# Patient Record
Sex: FEMALE | Race: WHITE | NOT HISPANIC OR LATINO | Employment: OTHER | URBAN - METROPOLITAN AREA
[De-identification: names, ages, dates, MRNs, and addresses within clinical notes are randomized per-mention and may not be internally consistent; named-entity substitution may affect disease eponyms.]

---

## 2017-01-20 ENCOUNTER — HOSPITAL ENCOUNTER (OUTPATIENT)
Dept: CARDIOLOGY | Facility: HOSPITAL | Age: 82
Discharge: HOME | End: 2017-01-20
Attending: INTERNAL MEDICINE | Admitting: INTERNAL MEDICINE

## 2019-02-04 ENCOUNTER — HOSPITAL ENCOUNTER (OUTPATIENT)
Dept: CARDIOLOGY | Facility: HOSPITAL | Age: 84
Discharge: HOME | End: 2019-02-04
Attending: INTERNAL MEDICINE | Admitting: INTERNAL MEDICINE

## 2019-07-08 ENCOUNTER — ON CAMPUS - OUTPATIENT (OUTPATIENT)
Dept: URBAN - METROPOLITAN AREA HOSPITAL 77 | Facility: HOSPITAL | Age: 84
End: 2019-07-08
Payer: COMMERCIAL

## 2019-07-08 DIAGNOSIS — K21.9 GASTRO-ESOPHAGEAL REFLUX DISEASE WITHOUT ESOPHAGITIS: ICD-10-CM

## 2019-07-08 DIAGNOSIS — R10.13 EPIGASTRIC PAIN: ICD-10-CM

## 2019-07-08 DIAGNOSIS — K29.70 GASTRITIS, UNSPECIFIED, WITHOUT BLEEDING: ICD-10-CM

## 2019-07-08 DIAGNOSIS — K44.9 DIAPHRAGMATIC HERNIA WITHOUT OBSTRUCTION OR GANGRENE: ICD-10-CM

## 2019-07-08 PROCEDURE — 43239 EGD BIOPSY SINGLE/MULTIPLE: CPT | Performed by: INTERNAL MEDICINE

## 2019-08-30 ENCOUNTER — OFFICE VISIT (OUTPATIENT)
Dept: CARDIOLOGY | Facility: CLINIC | Age: 84
End: 2019-08-30

## 2019-08-30 VITALS
HEIGHT: 55 IN | OXYGEN SATURATION: 94 % | DIASTOLIC BLOOD PRESSURE: 74 MMHG | SYSTOLIC BLOOD PRESSURE: 130 MMHG | BODY MASS INDEX: 30.18 KG/M2 | HEART RATE: 76 BPM | WEIGHT: 130.4 LBS

## 2019-08-30 DIAGNOSIS — I34.0 NON-RHEUMATIC MITRAL REGURGITATION: ICD-10-CM

## 2019-08-30 DIAGNOSIS — I25.10 CORONARY ARTERY DISEASE INVOLVING NATIVE CORONARY ARTERY OF NATIVE HEART WITHOUT ANGINA PECTORIS: Primary | ICD-10-CM

## 2019-08-30 DIAGNOSIS — Z98.61 POST PTCA: ICD-10-CM

## 2019-08-30 DIAGNOSIS — I10 ESSENTIAL HYPERTENSION: ICD-10-CM

## 2019-08-30 DIAGNOSIS — E78.5 HYPERLIPIDEMIA, UNSPECIFIED HYPERLIPIDEMIA TYPE: ICD-10-CM

## 2019-08-30 PROCEDURE — 93000 ELECTROCARDIOGRAM COMPLETE: CPT | Performed by: INTERNAL MEDICINE

## 2019-08-30 PROCEDURE — 99213 OFFICE O/P EST LOW 20 MIN: CPT | Performed by: INTERNAL MEDICINE

## 2019-08-30 RX ORDER — ACETAMINOPHEN 160 MG
TABLET,DISINTEGRATING ORAL
COMMUNITY
Start: 2019-02-13

## 2019-08-30 RX ORDER — ZOLEDRONIC ACID 5 MG/100ML
INJECTION, SOLUTION INTRAVENOUS
COMMUNITY
Start: 2018-08-08

## 2019-08-30 RX ORDER — DILTIAZEM HYDROCHLORIDE 300 MG/1
CAPSULE, COATED, EXTENDED RELEASE ORAL DAILY
COMMUNITY
Start: 2019-08-28 | End: 2020-02-25 | Stop reason: SDUPTHER

## 2019-08-30 RX ORDER — ATORVASTATIN CALCIUM 20 MG/1
20 TABLET, FILM COATED ORAL DAILY
Refills: 7 | COMMUNITY
Start: 2019-07-10 | End: 2019-10-05 | Stop reason: SDUPTHER

## 2019-08-30 RX ORDER — NITROGLYCERIN 0.4 MG/1
TABLET SUBLINGUAL
COMMUNITY
Start: 2013-05-30 | End: 2020-09-08

## 2019-08-30 RX ORDER — ASPIRIN 81 MG/1
TABLET ORAL
COMMUNITY

## 2019-08-30 RX ORDER — ISOSORBIDE MONONITRATE 60 MG/1
60 TABLET, EXTENDED RELEASE ORAL DAILY
Refills: 2 | COMMUNITY
Start: 2019-05-28 | End: 2020-02-28 | Stop reason: SDUPTHER

## 2019-08-30 RX ORDER — ESOMEPRAZOLE MAGNESIUM 40 MG/1
CAPSULE, DELAYED RELEASE ORAL
COMMUNITY
End: 2020-03-03

## 2019-08-30 RX ORDER — VENLAFAXINE HYDROCHLORIDE 75 MG/1
75 CAPSULE, EXTENDED RELEASE ORAL DAILY
Refills: 4 | COMMUNITY
Start: 2019-07-10

## 2019-08-30 NOTE — PROGRESS NOTES
Visit Type:  Follow-up Visit- 6 month   Primary Care Provider:  Dr. Alirio Bryatn     Chief Complaint:  echo results: .     History of Present Illness:        Dear Alirio     CC: Follow-up for coronary artery disease, hypertension, dyslipidemia, .Mitral regurgitation     Mrs. Renuka Rodrigez is a delightful 83 years old lady with history of coronary artery disease,  previous PCI stenting of LAD several years ago, generalized arthritis, dyslipidemia.      Her Christa scan Cardiolite study in June of 2016 was normal with no evidence of inducible myocardial ischemia.  Her LV ejection fraction was excellent.    She has had only one episode of left shoulder discomfort with some numbness in the left arm lasting for several minutes that eventually resolved.  There has been no further episodes.  Blood pressure today was 130/74.     Her last lipid profile showed total cholesterol  of 158 HDL 71 LDL 69 triglycerides 98 on 10/10/2017.      Her EKG today  showed normal sinus rhythm with a rate of 76 bpm.  WY interval was 173 ms QRS duration 90 ms  ms and QRS axis was 80.  As compared to previous EKG of 2/13/2019, there has been no significant change.    I will see her  in the office in 6 months for follow-up. her echocardiogram showed LV ejection fraction of 60-65%, mild-to-moderate mitral regurgitation slightly worse as compared to previous echocardiogram, mild aortic regurgitation and mild tricuspid   regurgitation.       A/P     1-CAD -   Status post PCI stenting 9 years ago.  Stable with no recurrence of angina.  She wishes to come off of Brilinta and I think that is quite reasonable.     2-Hypertension under good control with diltiazem long-acting     3-Dyslipidemia under good control   She was switched from Crestor to atorvastatin recently  4-   Valvular heart disease as noted in HPI     Thanks very much for allowing us to participate in the care of your patients                   Problems: Active problems were  reviewed with the patient during this visit.  Medications: Medications were reviewed with the patient during this visit.  Allergies: Allergies were reviewed with the patient during this visit.                   Past Medical History:     Reviewed history from 07/08/2016 and no changes required:        Coronary Artery Disease: S/P PCI         Hypertension        Anxiety Disorder        Arthritis        Hyperlipidemia        Osteoporosis        Bulging disc in spine         Factor 5         Valvular Heart Disease     Past Surgical History:     Reviewed history from 12/11/2015 and no changes required:        Heart Catherization Status Post PCI to LAD 2008        Bladder Repair         Stomach Surgery         Cholecystectomy        Right breast biopsy : Dec. 2014        Partial Hysterectomy         Cataract Extraction        Total Knee Arthroplasty: Left - Aug. 27, 2015     Active Medications (reviewed today):  ATORVASTATIN CALCIUM 20 MG ORAL TABLET (ATORVASTATIN CALCIUM) Take 1 tablet by mouth daily  RECLAST 5 MG/100ML INTRAVENOUS SOLUTION (ZOLEDRONIC ACID) Yearly  HYDROCHLOROTHIAZIDE 25 MG ORAL TABLET (HYDROCHLOROTHIAZIDE) Take 1 tablet by mouth daily  DILTIAZEM 24HR  MG CAP (DILTIAZEM HCL COATED BEADS) TAKE 1 CAPSULE BY MOUTH EVERY DAY  VITAMIN D3 2000 UNIT ORAL CAPSULE (CHOLECALCIFEROL) Take one by mouth daily  ASPIR-LOW 81 MG ORAL TABLET DELAYED RELEASE (ASPIRIN) Take 1 tablet by mouth daily  CELEBREX 200 MG ORAL CAPSULE (CELECOXIB) Take 1 tablet by mouth daily  EFFEXOR XR 75 MG ORAL CAPSULE EXTENDED RELEASE 24 HOUR (VENLAFAXINE HCL) Take 1 tablet by mouth daily  NEXIUM 40 MG ORAL CAPSULE DELAYED RELEASE (ESOMEPRAZOLE MAGNESIUM) Take 1 tablet by mouth daily  BRILINTA 90 MG TABLET (TICAGRELOR) TAKE 1 TABLET BY MOUTH TWICE A DAY  ISOSORBIDE MONONIT ER 60 MG TB (ISOSORBIDE MONONITRATE) TAKE 1 TABLET BY MOUTH EVERY DAY  CITRACAL/VITAMIN D 250-200 MG-UNIT ORAL TABLET (CALCIUM CITRATE-VITAMIN D) 1 tablet  daily  NITROSTAT 0.4 MG SUBLINGUAL TABLET SUBLINGUAL (NITROGLYCERIN) Sublingul Prn     Current Allergies (reviewed today):  ERYTHROMYCIN (Critical)     Family History Summary:      Reviewed history Last on 08/08/2018 and no changes required:02/13/2019        General Comments - FH:  FH Diabetes - Paternal / Sibling   Family History of Arthritis  FH Stroke Sibling   FH Heart Disease: daughter age 60 passed away May 2014 of massive MI , another daughter age 57 June 2014 had PCI / 2 stents placed.         Social History:     Reviewed history from 05/30/2013 and no changes required:        Marital Status:          Children: 3 - Special needs Child / Living at Home        Occupation: retired            Risk Factors:      Smoked Tobacco Use:  Never smoker  Smokeless Tobacco Use:  Never  Passive smoke exposure:  no  Drug use:  no  HIV high-risk behavior:  no  Caffeine use:  1 drinks per day  Alcohol use:  yes     Type:  Glass of wine- rarely      Has patient --        Felt need to cut down:  no        Been annoyed by complaints:  no        Felt guilty about drinking:  no        Needed eye opener in the morning:  no     Counseled to quit/cut down alcohol use:  no  Exercise:  no  Seatbelt use:  100 %  Sun Exposure:  occasionally     Family History Risk Factors:     Family History of MI in females < 65 years old:  no     Family History of MI in males < 55 years old:  no           Review of Systems   General: denies fevers, chills, sweats, anorexia, fatigue, malaise, weight loss  Eyes: denies blurring, diplopia, irritation, discharge, vision loss, eye pain, photophobia  Ear/Nose/Throat: denies ear pain or discharge, tinnitus, decreased hearing, nasal obstruction or discharge, nosebleeds, sore throat, hoarseness, dysphagia  Cardiovascular: coronary artery disease, status post PCI stenting of LAD 2008. Hypertension. Dyslipidemia.  negative myocardial perfusion imaging study on 06/24/2016.  valvular heart  disease  Respiratory: Denies cough, dyspnea, excessive sputum, hemoptysis, wheezing  Gastrointestinal: history of hiatal hernia  Musculoskeletal: denies back pain, joint pain, joint swelling, muscle cramps, muscle weakness, stiffness, arthritis  Skin: denies rash, itching, dryness, suspicious lesions  Neurologic: denies transient paralysis, weakness, paresthesias, seizures, syncope, tremors, vertigo  Psychiatric: denies depression, anxiety, memory loss, mental disturbance, suicidal ideation, hallucinations, paranoia  Endocrine: denies cold intolerance, heat intolerance, polydipsia, polyphagia, polyuria, weight change        Physical Exam     General:      well developed, well nourished, in no acute distress.    Neck:      no masses, thyromegaly, or abnormal cervical nodes.   no JVD. No carotid bruits  Lungs:      clear bilaterally to auscultation.    Heart:      non-displaced PMI, chest non-tender; regular rate and rhythm, S1, S2 without murmurs, rubs, or gallops  Pulses:      pulses normal in all 4 extremities.    Extremities:       no edema       New Prescriptions/Refills:

## 2019-10-07 RX ORDER — ATORVASTATIN CALCIUM 20 MG/1
TABLET, FILM COATED ORAL
Qty: 90 TABLET | Refills: 2 | Status: SHIPPED | OUTPATIENT
Start: 2019-10-07 | End: 2020-06-30 | Stop reason: SDUPTHER

## 2019-11-26 ENCOUNTER — OFFICE (OUTPATIENT)
Dept: URBAN - METROPOLITAN AREA CLINIC 64 | Facility: CLINIC | Age: 84
End: 2019-11-26

## 2019-11-26 VITALS
HEIGHT: 56 IN | WEIGHT: 130 LBS | HEART RATE: 74 BPM | SYSTOLIC BLOOD PRESSURE: 144 MMHG | DIASTOLIC BLOOD PRESSURE: 72 MMHG

## 2019-11-26 DIAGNOSIS — K44.9 DIAPHRAGMATIC HERNIA WITHOUT OBSTRUCTION OR GANGRENE: ICD-10-CM

## 2019-11-26 DIAGNOSIS — K21.9 GASTRO-ESOPHAGEAL REFLUX DISEASE WITHOUT ESOPHAGITIS: ICD-10-CM

## 2019-11-26 DIAGNOSIS — Z86.010 PERSONAL HISTORY OF COLONIC POLYPS: ICD-10-CM

## 2019-11-26 DIAGNOSIS — E11.9 TYPE 2 DIABETES MELLITUS WITHOUT COMPLICATIONS: ICD-10-CM

## 2019-11-26 PROCEDURE — 99213 OFFICE O/P EST LOW 20 MIN: CPT | Performed by: INTERNAL MEDICINE

## 2019-11-26 RX ORDER — DEXLANSOPRAZOLE 60 MG/1
60 CAPSULE, DELAYED RELEASE ORAL
Qty: 90 | Refills: 3 | Status: COMPLETED
Start: 2019-11-26 | End: 2019-12-02

## 2020-01-30 ENCOUNTER — OFFICE (OUTPATIENT)
Dept: URBAN - METROPOLITAN AREA CLINIC 64 | Facility: CLINIC | Age: 85
End: 2020-01-30

## 2020-01-30 VITALS — WEIGHT: 128 LBS | HEIGHT: 56 IN

## 2020-01-30 DIAGNOSIS — K44.9 DIAPHRAGMATIC HERNIA WITHOUT OBSTRUCTION OR GANGRENE: ICD-10-CM

## 2020-01-30 DIAGNOSIS — K21.9 GASTRO-ESOPHAGEAL REFLUX DISEASE WITHOUT ESOPHAGITIS: ICD-10-CM

## 2020-01-30 PROCEDURE — 99213 OFFICE O/P EST LOW 20 MIN: CPT | Performed by: NURSE PRACTITIONER

## 2020-01-30 RX ORDER — DEXLANSOPRAZOLE 60 MG/1
60 CAPSULE, DELAYED RELEASE ORAL
Qty: 90 | Refills: 3 | Status: COMPLETED
Start: 2020-01-30 | End: 2022-09-16

## 2020-02-25 RX ORDER — DILTIAZEM HYDROCHLORIDE 300 MG/1
300 CAPSULE, COATED, EXTENDED RELEASE ORAL DAILY
Qty: 60 CAPSULE | Refills: 1 | Status: SHIPPED | OUTPATIENT
Start: 2020-02-25 | End: 2020-02-26 | Stop reason: SDUPTHER

## 2020-02-26 RX ORDER — DILTIAZEM HYDROCHLORIDE 300 MG/1
300 CAPSULE, COATED, EXTENDED RELEASE ORAL DAILY
Qty: 60 CAPSULE | Refills: 1 | Status: SHIPPED | OUTPATIENT
Start: 2020-02-26 | End: 2020-05-26

## 2020-02-28 RX ORDER — ISOSORBIDE MONONITRATE 60 MG/1
60 TABLET, EXTENDED RELEASE ORAL DAILY
Qty: 90 TABLET | Refills: 0 | Status: SHIPPED | OUTPATIENT
Start: 2020-02-28 | End: 2020-03-03 | Stop reason: SDUPTHER

## 2020-03-03 ENCOUNTER — OFFICE VISIT (OUTPATIENT)
Dept: CARDIOLOGY | Facility: CLINIC | Age: 85
End: 2020-03-03

## 2020-03-03 VITALS
HEIGHT: 55 IN | BODY MASS INDEX: 29.62 KG/M2 | DIASTOLIC BLOOD PRESSURE: 74 MMHG | WEIGHT: 128 LBS | HEART RATE: 72 BPM | SYSTOLIC BLOOD PRESSURE: 125 MMHG | OXYGEN SATURATION: 96 %

## 2020-03-03 DIAGNOSIS — R01.1 HEART MURMUR: ICD-10-CM

## 2020-03-03 DIAGNOSIS — I10 ESSENTIAL HYPERTENSION: ICD-10-CM

## 2020-03-03 DIAGNOSIS — I82.811 LEG VEIN THROMBOEMBOLISM, SUPERFICIAL, RIGHT: ICD-10-CM

## 2020-03-03 DIAGNOSIS — Z79.02 LONG TERM (CURRENT) USE OF ANTITHROMBOTICS/ANTIPLATELETS: ICD-10-CM

## 2020-03-03 DIAGNOSIS — D68.51 HETEROZYGOUS FACTOR V LEIDEN MUTATION (HCC): ICD-10-CM

## 2020-03-03 DIAGNOSIS — E78.2 MIXED HYPERLIPIDEMIA: ICD-10-CM

## 2020-03-03 DIAGNOSIS — I25.10 CORONARY ARTERY DISEASE INVOLVING NATIVE CORONARY ARTERY OF NATIVE HEART WITHOUT ANGINA PECTORIS: Primary | ICD-10-CM

## 2020-03-03 PROCEDURE — 93000 ELECTROCARDIOGRAM COMPLETE: CPT | Performed by: INTERNAL MEDICINE

## 2020-03-03 PROCEDURE — 99214 OFFICE O/P EST MOD 30 MIN: CPT | Performed by: INTERNAL MEDICINE

## 2020-03-03 RX ORDER — DEXLANSOPRAZOLE 60 MG/1
CAPSULE, DELAYED RELEASE ORAL DAILY
COMMUNITY
Start: 2020-01-31

## 2020-03-03 RX ORDER — RIVAROXABAN 20 MG/1
20 TABLET, FILM COATED ORAL DAILY
COMMUNITY
Start: 2020-02-07 | End: 2020-09-03

## 2020-03-03 RX ORDER — ISOSORBIDE MONONITRATE 60 MG/1
60 TABLET, EXTENDED RELEASE ORAL DAILY
Qty: 90 TABLET | Refills: 0 | Status: SHIPPED | OUTPATIENT
Start: 2020-03-03 | End: 2020-03-09

## 2020-03-03 NOTE — PATIENT INSTRUCTIONS
Get labs from Dr ogden office     BMP, Lipids, LFT A1C  Get note from Dr Spencer office  F/U 6 months

## 2020-03-03 NOTE — PROGRESS NOTES
Cardiology Office Visit      Encounter Date:  03/03/2020    Patient ID:   Renuka Rodrigez is a 84 y.o. female.    Reason For Followup:  Coronary artery disease  Hypertension  Hyperlipidemia  Valvular heart disease    Brief Clinical History:  Dear Dr. Bryant, Yosef Madera MD    I had the pleasure of seeing Renuka Rodrigez today. As you are well aware, this is a 84 y.o. female with an established history of ischemic heart disease.  She underwent PCI of her left anterior descending artery in 2008.  She has additional history that includes hypertension, hypertensive cardiovascular disease, hyperlipidemia, antiplatelet therapy, and venous thrombosis.  She presents today for follow-up on the above conditions.    Interval History:  She denies any chest pain pressure heaviness or tightness.  She denies any shortness of breath out of character.  She denies any PND orthopnea.  She denies any syncope or near syncope.  She reports feeling well from a cardiac perspective.    She was diagnosed with a superficial thrombosis several months ago in her lower extremity with some questionable involvement in her deep veins.  She was started on Xarelto in an effort to resolve the clot.  She was taken off of this 3 weeks ago by her hematologist Dr Spencer.  She has a follow-up appointment with him in the next week or 2.  She has a questionable history of factor V Leiden deficiency.  We will obtain records from his office to confirm the status of this.    Assessment & Plan    Impressions:  Coronary artery disease status post PCI LAD 2008  Hypertension  Hypertensive cardiovascular disease  Hyperlipidemia  Antiplatelet therapy  Venous thrombosis  Questionable history of factor V Leiden deficiency  Valvular heart disease with mild to moderate mitral insufficiency, mild aortic insufficiency, mild tricuspid and pulmonic insufficiencies.    Recommendations:  Continuation of her current cardiovascular regimen at the present time.  Obtain  "labs from your office  Get notes from hematology office  Periodic echocardiogram  Follow-up in 6 months time sooner should there be difficulties.      Was on Xarelto for superficual thrombosis several months and was then took off about 3 weeks ago and will have another u/s and will be seen on 24th for f/u US    Has factor 5 leiden on one side of family  Has had a couple of chest pains, cares for55 year old handicapped daughter and has 24 hours care shock at bedtime and lasts a few moments and goes away    Coup;e times in the past few months  Will need repeat echpo in about 6-12 months  Has AS murmur        Objective:    Vitals:  Vitals:    03/03/20 1411   BP: 125/74   Pulse: 72   SpO2: 96%   Weight: 58.1 kg (128 lb)   Height: 137.2 cm (54\")       Physical Exam:    General: Alert, cooperative, no distress, appears stated age  Head:  Normocephalic, atraumatic, mucous membranes moist  Eyes:  Conjunctiva/corneas clear, EOM's intact     Neck:  Supple,  no bruit     Lungs: Clear to auscultation bilaterally, no wheezes rhonchi rales are noted  Chest wall: No tenderness  Heart::  Regular rate and rhythm, S1 and S2 normal, 2/6 systolic ejection murmur rub or gallop  Abdomen: Soft, non-tender, nondistended bowel sounds active  Extremities: No cyanosis, clubbing, or edema  Pulses: 2+ and symmetric all extremities  Skin:  No rashes or lesions  Neuro/psych: A&O x3. CN II through XII are grossly intact with appropriate affect      Allergies:  Allergies   Allergen Reactions   • Erythromycin GI Intolerance       Medication Review:     Current Outpatient Medications:   •  aspirin (ASPIR-LOW) 81 MG EC tablet, ASPIR-LOW 81 MG TBEC, Disp: , Rfl:   •  atorvastatin (LIPITOR) 20 MG tablet, TAKE 1 TABLET BY MOUTH EVERY DAY, Disp: 90 tablet, Rfl: 2  •  Calcium Citrate-Vitamin D 1000-400 liquid, CITRACAL/VITAMIN D 250-200 MG-UNIT ORAL TABLET, Disp: , Rfl:   •  dexlansoprazole (DEXILANT) 60 MG capsule, Daily., Disp: , Rfl:   •  dilTIAZem CD " (CARDIZEM CD) 300 MG 24 hr capsule, Take 1 capsule by mouth Daily., Disp: 60 capsule, Rfl: 1  •  isosorbide mononitrate (IMDUR) 60 MG 24 hr tablet, Take 1 tablet by mouth Daily., Disp: 90 tablet, Rfl: 0  •  Multiple Vitamins-Minerals (MULTIVITAMIN ADULTS) tablet, Take  by mouth Daily., Disp: , Rfl:   •  nitroglycerin (NITROSTAT) 0.4 MG SL tablet, NITROSTAT 0.4 MG SUBL, Disp: , Rfl:   •  venlafaxine XR (EFFEXOR-XR) 75 MG 24 hr capsule, Take 75 mg by mouth Daily., Disp: , Rfl: 4  •  zoledronic acid (RECLAST) 5 MG/100ML solution infusion, RECLAST 5 MG/100ML SOLN, Disp: , Rfl:   •  Cholecalciferol (VITAMIN D3) 2000 units capsule, VITAMIN D3 2000 UNIT CAPS, Disp: , Rfl:   •  XARELTO 20 MG tablet, Take 20 mg by mouth Daily., Disp: , Rfl:     Family History:  Family History   Problem Relation Age of Onset   • Stroke Mother    • Stroke Sister    • Stroke Sister    • Heart attack Daughter    • Heart disease Daughter        Past Medical History:  Past Medical History:   Diagnosis Date   • Anxiety disorder    • Arthritis    • Coronary artery disease    • Diabetes mellitus (CMS/HCC)    • Disc disorder    • Factor 5 Leiden mutation, heterozygous (CMS/HCC)    • GERD (gastroesophageal reflux disease)    • Hiatal hernia    • History of blood clots 2020    superficial in right leg    • Hyperlipidemia    • Hypertension    • Neuropathy    • Osteoporosis    • Valvular disease        Past surgical History:  Past Surgical History:   Procedure Laterality Date   • ABDOMINAL SURGERY     • BLADDER REPAIR     • BREAST BIOPSY Right    • CARDIAC CATHETERIZATION     • CATARACT EXTRACTION     • CHOLECYSTECTOMY     • CORONARY ANGIOPLASTY     • HYSTERECTOMY      partial    • TOTAL KNEE ARTHROPLASTY Left        Social History:  Social History     Socioeconomic History   • Marital status:      Spouse name: Not on file   • Number of children: Not on file   • Years of education: Not on file   • Highest education level: Not on file   Tobacco Use    • Smoking status: Never Smoker   • Smokeless tobacco: Never Used   Substance and Sexual Activity   • Alcohol use: Yes     Frequency: Never     Comment: occasional wine    • Drug use: No       Review of Systems:  The following systems were reviewed as they relate to the cardiovascular system: Constitutional, Eyes, ENT, Cardiovascular, Respiratory, Gastrointestinal, Integumentary, Neurological, Psychiatric, Hematologic, Endocrine, Musculoskeletal, and Genitourinary. The pertinent cardiovascular findings are reported above with all other cardiovascular points within those systems being negative.    Diagnostic Study Review:     Current Electrocardiogram:    ECG 12 Lead  Date/Time: 3/3/2020 7:52 AM  Performed by: Felton Patel DO  Authorized by: Felton Patel DO   Comparison: not compared with previous ECG   Previous ECG: no previous ECG available  Comments: Normal sinus rhythm with a ventricular to 72 bpm.  Low voltage in the precordial leads.  Consider LVH with secondary repolarization changes.  Normal QT and QTc intervals.  Normal QRS axis.              NOTE: The following portions of the patient's history were reviewed and updated this visit as appropriate: allergies, current medications, past family history, past medical history, past social history, past surgical history and problem list.

## 2020-03-04 PROBLEM — Z79.02 LONG TERM (CURRENT) USE OF ANTITHROMBOTICS/ANTIPLATELETS: Status: ACTIVE | Noted: 2020-03-04

## 2020-03-04 PROBLEM — E78.5 HYPERLIPIDEMIA: Status: ACTIVE | Noted: 2020-03-04

## 2020-03-04 PROBLEM — I25.10 CORONARY ARTERY DISEASE: Status: ACTIVE | Noted: 2020-03-04

## 2020-03-04 PROBLEM — D68.51 HETEROZYGOUS FACTOR V LEIDEN MUTATION (HCC): Status: ACTIVE | Noted: 2020-01-22

## 2020-03-04 PROBLEM — I10 HYPERTENSION: Status: ACTIVE | Noted: 2020-03-04

## 2020-03-04 PROBLEM — I82.811: Status: ACTIVE | Noted: 2019-12-11

## 2020-03-04 PROBLEM — R01.1 HEART MURMUR: Status: ACTIVE | Noted: 2017-01-13

## 2020-03-09 RX ORDER — ISOSORBIDE MONONITRATE 60 MG/1
TABLET, EXTENDED RELEASE ORAL
Qty: 90 TABLET | Refills: 2 | Status: SHIPPED | OUTPATIENT
Start: 2020-03-09 | End: 2021-01-14

## 2020-05-26 RX ORDER — DILTIAZEM HYDROCHLORIDE 300 MG/1
CAPSULE, COATED, EXTENDED RELEASE ORAL
Qty: 90 CAPSULE | Refills: 1 | Status: SHIPPED | OUTPATIENT
Start: 2020-05-26 | End: 2021-02-25

## 2020-06-30 RX ORDER — ATORVASTATIN CALCIUM 20 MG/1
20 TABLET, FILM COATED ORAL DAILY
Qty: 90 TABLET | Refills: 2 | Status: SHIPPED | OUTPATIENT
Start: 2020-06-30 | End: 2020-09-03

## 2020-08-27 PROBLEM — R60.9 EDEMA: Status: ACTIVE | Noted: 2017-01-13

## 2020-08-27 PROBLEM — M19.90 ARTHRITIS: Status: ACTIVE | Noted: 2020-08-27

## 2020-08-27 PROBLEM — F41.9 ANXIETY DISORDER: Status: ACTIVE | Noted: 2020-08-27

## 2020-09-03 ENCOUNTER — OFFICE VISIT (OUTPATIENT)
Dept: CARDIOLOGY | Facility: CLINIC | Age: 85
End: 2020-09-03

## 2020-09-03 VITALS
BODY MASS INDEX: 30.32 KG/M2 | RESPIRATION RATE: 18 BRPM | DIASTOLIC BLOOD PRESSURE: 79 MMHG | OXYGEN SATURATION: 97 % | WEIGHT: 131 LBS | SYSTOLIC BLOOD PRESSURE: 146 MMHG | HEIGHT: 55 IN | HEART RATE: 82 BPM

## 2020-09-03 DIAGNOSIS — Z79.02 LONG TERM (CURRENT) USE OF ANTITHROMBOTICS/ANTIPLATELETS: ICD-10-CM

## 2020-09-03 DIAGNOSIS — I38 VALVULAR HEART DISEASE: Primary | ICD-10-CM

## 2020-09-03 DIAGNOSIS — R07.89 CHEST DISCOMFORT: ICD-10-CM

## 2020-09-03 DIAGNOSIS — I82.811 LEG VEIN THROMBOEMBOLISM, SUPERFICIAL, RIGHT: ICD-10-CM

## 2020-09-03 DIAGNOSIS — I25.10 CORONARY ARTERY DISEASE INVOLVING NATIVE CORONARY ARTERY OF NATIVE HEART WITHOUT ANGINA PECTORIS: ICD-10-CM

## 2020-09-03 DIAGNOSIS — I10 ESSENTIAL HYPERTENSION: ICD-10-CM

## 2020-09-03 DIAGNOSIS — E78.2 MIXED HYPERLIPIDEMIA: ICD-10-CM

## 2020-09-03 PROCEDURE — 99214 OFFICE O/P EST MOD 30 MIN: CPT | Performed by: INTERNAL MEDICINE

## 2020-09-03 PROCEDURE — 93000 ELECTROCARDIOGRAM COMPLETE: CPT | Performed by: INTERNAL MEDICINE

## 2020-09-03 RX ORDER — ROSUVASTATIN CALCIUM 10 MG/1
10 TABLET, COATED ORAL DAILY
COMMUNITY
Start: 2020-08-24

## 2020-09-03 RX ORDER — METHOCARBAMOL 750 MG/1
TABLET, FILM COATED ORAL
COMMUNITY
Start: 2020-06-03 | End: 2021-11-11

## 2020-09-03 NOTE — PROGRESS NOTES
Cardiology Office Visit      Encounter Date:  09/03/2020    Patient ID:   Renuka Rodrigez is a 85 y.o. female.    Reason For Followup:  Coronary artery disease  Hypertension  Hyperlipidemia  Valvular heart disease    Brief Clinical History:  Dear Dr. Bryant, Yosef Madera MD    I had the pleasure of seeing Renuka Rodrigez today. As you are well aware, this is a 85 y.o. female with an established history of ischemic heart disease.  She underwent PCI of her left anterior descending artery in 2008.  She has additional history that includes hypertension, hypertensive cardiovascular disease, hyperlipidemia, antiplatelet therapy, and venous thrombosis.  She presents today for follow-up on the above conditions.    Interval History:  She denies any chest pain pressure heaviness or tightness.  She denies any shortness of breath out of character.  She denies any PND orthopnea.  She denies any syncope or near syncope.  She reports feeling well from a cardiac perspective.    She has been seeing Dr. Spencer and Dr. Stevens for her superficial thrombosis She may need to have a procedure to help with this.  She is taking aspirin.  She also reports that she has had some nonexertional chest aches that occur at night.  There is some radiation down her left arm.  This again is nonexertional.  She reports that her left arm will go numb from time to time and is independent of the discomfort.    Assessment & Plan    Impressions:  Coronary artery disease status post PCI LAD 2008  Hypertension  Hypertensive cardiovascular disease  Hyperlipidemia  Antiplatelet therapy  Venous thrombosis  Questionable history of factor V Leiden deficiency  Valvular heart disease with mild to moderate mitral insufficiency, mild aortic insufficiency, mild tricuspid and pulmonic insufficiencies.    Recommendations:  Continuation of her current cardiovascular regimen at the present time.  Obtain labs from your office  Periodic echocardiogram to check for  "valvular insufficiency  Follow-up in 6 months time sooner should there be difficulties.    Objective:    Vitals:  Vitals:    09/03/20 1121   BP: 146/79   BP Location: Left arm   Patient Position: Sitting   Cuff Size: Large Adult   Pulse: 82   Resp: 18   SpO2: 97%   Weight: 59.4 kg (131 lb)   Height: 139.7 cm (55\")       Physical Exam:    General: Alert, cooperative, no distress, appears stated age  Head:  Normocephalic, atraumatic, mucous membranes moist  Eyes:  Conjunctiva/corneas clear, EOM's intact     Neck:  Supple,  no bruit  Lungs: Clear to auscultation bilaterally, no wheezes rhonchi rales are noted  Chest wall: No tenderness  Heart::  Regular rate and rhythm, S1 and S2 normal, 1/6 holosystolic murmur.  No rub or gallop  Abdomen: Soft, non-tender, nondistended bowel sounds active  Extremities: No cyanosis, clubbing, or edema  Pulses: 2+ and symmetric all extremities  Skin:  No rashes or lesions  Neuro/psych: A&O x3. CN II through XII are grossly intact with appropriate affect      Allergies:  Allergies   Allergen Reactions   • Erythromycin GI Intolerance       Medication Review:     Current Outpatient Medications:   •  aspirin (ASPIR-LOW) 81 MG EC tablet, ASPIR-LOW 81 MG TBEC, Disp: , Rfl:   •  atorvastatin (LIPITOR) 20 MG tablet, Take 1 tablet by mouth Daily., Disp: 90 tablet, Rfl: 2  •  Calcium Citrate-Vitamin D 1000-400 liquid, CITRACAL/VITAMIN D 250-200 MG-UNIT ORAL TABLET, Disp: , Rfl:   •  Cholecalciferol (VITAMIN D3) 2000 units capsule, VITAMIN D3 2000 UNIT CAPS, Disp: , Rfl:   •  dexlansoprazole (DEXILANT) 60 MG capsule, Daily., Disp: , Rfl:   •  dilTIAZem CD (CARDIZEM CD) 300 MG 24 hr capsule, TAKE 1 CAPSULE BY MOUTH EVERY DAY, Disp: 90 capsule, Rfl: 1  •  isosorbide mononitrate (IMDUR) 60 MG 24 hr tablet, TAKE 1 TABLET BY MOUTH EVERY DAY, Disp: 90 tablet, Rfl: 2  •  methocarbamol (ROBAXIN) 750 MG tablet, TAKE 1 TABLET BY MOUTH 4 TIMES A DAY AS NEEDED, Disp: , Rfl:   •  Multiple Vitamins-Minerals " (MULTIVITAMIN ADULTS) tablet, Take  by mouth Daily., Disp: , Rfl:   •  nitroglycerin (NITROSTAT) 0.4 MG SL tablet, NITROSTAT 0.4 MG SUBL, Disp: , Rfl:   •  rosuvastatin (CRESTOR) 10 MG tablet, Take 10 mg by mouth Daily., Disp: , Rfl:   •  venlafaxine XR (EFFEXOR-XR) 75 MG 24 hr capsule, Take 75 mg by mouth Daily., Disp: , Rfl: 4  •  XARELTO 20 MG tablet, Take 20 mg by mouth Daily., Disp: , Rfl:   •  zoledronic acid (RECLAST) 5 MG/100ML solution infusion, RECLAST 5 MG/100ML SOLN, Disp: , Rfl:     Family History:  Family History   Problem Relation Age of Onset   • Stroke Mother    • Stroke Sister    • Stroke Sister    • Heart attack Daughter    • Heart disease Daughter        Past Medical History:  Past Medical History:   Diagnosis Date   • Anxiety disorder    • Arthritis    • Coronary artery disease    • Diabetes mellitus (CMS/HCC)    • Disc disorder    • Factor 5 Leiden mutation, heterozygous (CMS/HCC)    • GERD (gastroesophageal reflux disease)    • Hiatal hernia    • History of blood clots 2020    superficial in right leg    • Hyperlipidemia    • Hypertension    • Neuropathy    • Osteoporosis    • Valvular disease        Past surgical History:  Past Surgical History:   Procedure Laterality Date   • ABDOMINAL SURGERY     • BLADDER REPAIR     • BREAST BIOPSY Right    • CARDIAC CATHETERIZATION     • CATARACT EXTRACTION     • CHOLECYSTECTOMY     • CORONARY ANGIOPLASTY     • HYSTERECTOMY      partial    • TOTAL KNEE ARTHROPLASTY Left        Social History:  Social History     Socioeconomic History   • Marital status:      Spouse name: Not on file   • Number of children: Not on file   • Years of education: Not on file   • Highest education level: Not on file   Tobacco Use   • Smoking status: Never Smoker   • Smokeless tobacco: Never Used   Substance and Sexual Activity   • Alcohol use: Yes     Frequency: Never     Comment: occasional wine    • Drug use: No       Review of Systems:  The following systems were  reviewed as they relate to the cardiovascular system: Constitutional, Eyes, ENT, Cardiovascular, Respiratory, Gastrointestinal, Integumentary, Neurological, Psychiatric, Hematologic, Endocrine, Musculoskeletal, and Genitourinary. The pertinent cardiovascular findings are reported above with all other cardiovascular points within those systems being negative.    Diagnostic Study Review:     Current Electrocardiogram:    ECG 12 Lead  Date/Time: 9/3/2020 1:05 PM  Performed by: Felton Patel DO  Authorized by: Felton Patel DO   Comparison: not compared with previous ECG   Previous ECG: no previous ECG available  Comments: Normal sinus rhythm with a ventricular rate of 79 bpm.  Borderline right axis deviation.  Low voltage in the precordial leads.  Normal QT and QTc intervals.              NOTE: The following portions of the patient's history were reviewed and updated this visit as appropriate: allergies, current medications, past family history, past medical history, past social history, past surgical history and problem list.

## 2020-09-08 RX ORDER — NITROGLYCERIN 0.4 MG/1
TABLET SUBLINGUAL
Qty: 75 TABLET | Refills: 1 | Status: SHIPPED | OUTPATIENT
Start: 2020-09-08 | End: 2022-02-21

## 2020-09-29 ENCOUNTER — OFFICE (OUTPATIENT)
Dept: URBAN - METROPOLITAN AREA CLINIC 64 | Facility: CLINIC | Age: 85
End: 2020-09-29

## 2020-09-29 VITALS
SYSTOLIC BLOOD PRESSURE: 120 MMHG | HEIGHT: 56 IN | WEIGHT: 132 LBS | HEART RATE: 75 BPM | DIASTOLIC BLOOD PRESSURE: 72 MMHG

## 2020-09-29 DIAGNOSIS — R13.10 DYSPHAGIA, UNSPECIFIED: ICD-10-CM

## 2020-09-29 DIAGNOSIS — K44.9 DIAPHRAGMATIC HERNIA WITHOUT OBSTRUCTION OR GANGRENE: ICD-10-CM

## 2020-09-29 DIAGNOSIS — R07.9 CHEST PAIN, UNSPECIFIED: ICD-10-CM

## 2020-09-29 DIAGNOSIS — K21.9 GASTRO-ESOPHAGEAL REFLUX DISEASE WITHOUT ESOPHAGITIS: ICD-10-CM

## 2020-09-29 PROCEDURE — 99213 OFFICE O/P EST LOW 20 MIN: CPT | Performed by: NURSE PRACTITIONER

## 2020-09-29 RX ORDER — SUCRALFATE 1 G/1
4 TABLET ORAL
Qty: 120 | Refills: 11 | Status: COMPLETED
Start: 2020-09-29 | End: 2022-09-16

## 2020-10-08 ENCOUNTER — OFFICE VISIT (OUTPATIENT)
Dept: CARDIOLOGY | Facility: CLINIC | Age: 85
End: 2020-10-08

## 2020-10-08 VITALS
HEART RATE: 86 BPM | DIASTOLIC BLOOD PRESSURE: 77 MMHG | SYSTOLIC BLOOD PRESSURE: 126 MMHG | OXYGEN SATURATION: 97 % | BODY MASS INDEX: 30.45 KG/M2 | WEIGHT: 131 LBS

## 2020-10-08 DIAGNOSIS — R07.89 CHEST DISCOMFORT: ICD-10-CM

## 2020-10-08 DIAGNOSIS — R01.1 HEART MURMUR: ICD-10-CM

## 2020-10-08 DIAGNOSIS — R06.02 SHORTNESS OF BREATH: ICD-10-CM

## 2020-10-08 DIAGNOSIS — I34.9 NONRHEUMATIC MITRAL VALVE DISORDER: ICD-10-CM

## 2020-10-08 DIAGNOSIS — I10 ESSENTIAL HYPERTENSION: ICD-10-CM

## 2020-10-08 DIAGNOSIS — Z79.02 LONG TERM (CURRENT) USE OF ANTITHROMBOTICS/ANTIPLATELETS: ICD-10-CM

## 2020-10-08 DIAGNOSIS — I25.10 CORONARY ARTERY DISEASE INVOLVING NATIVE CORONARY ARTERY OF NATIVE HEART WITHOUT ANGINA PECTORIS: Primary | ICD-10-CM

## 2020-10-08 DIAGNOSIS — E78.2 MIXED HYPERLIPIDEMIA: ICD-10-CM

## 2020-10-08 PROCEDURE — 93000 ELECTROCARDIOGRAM COMPLETE: CPT | Performed by: NURSE PRACTITIONER

## 2020-10-08 PROCEDURE — 99214 OFFICE O/P EST MOD 30 MIN: CPT | Performed by: NURSE PRACTITIONER

## 2020-10-08 RX ORDER — SUCRALFATE 1 G/1
1 TABLET ORAL 4 TIMES DAILY
COMMUNITY
End: 2021-11-11

## 2020-10-08 NOTE — PROGRESS NOTES
[facility] CARDIOLOGY      REASON FOR FOLLOW-UP:  Coronary artery disease  Hypertension  Hyperlipidemia  Valvular heart disease          Chief Complaint   Patient presents with   • Chest Pain     pt having chest pain    • Coronary Artery Disease   • Hypertension   • Hyperlipidemia         Dear Dr. Bryant,        History of Present Illness     I had the pleasure of seeing Renuka Rodrigez today. As you are aware, this is an 85 y.o. female with an established history of ischemic heart disease.  She underwent PCI of her left anterior descending artery in 2008.  She has additional history that includes hypertension, hypertensive cardiovascular disease, hyperlipidemia, antiplatelet therapy, and venous thrombosis.  She presents today in follow-up for the above-mentioned diagnoses and for symptoms of chest discomfort/shortness of breath.    The patient reports over the last 3 weeks she has had 3 episodes of midsternal chest discomfort described as a heaviness that radiates into her left shoulder.  She denies any associated diaphoresis, nausea or vomiting, dizziness or lightheadedness.  Patient stated she cares for her handicapped daughter at home and has noticed shortness of breath with activity as well.  She denies any lower extremity edema.  Patient's last echocardiogram was performed 2/4/2019 showing normal LV function with EF 60-65%, mild-moderate MR, mild atrial /tricuspid/pulmonic insufficiency.    Assessment:  Chest pain  Shortness of breath  History of PCI to the LAD  History of valvular heart disease  History of hypertension with hypertensive cardiovascular disease  Dyslipidemia  Antiplatelet therapy    Plan:  We will schedule patient for full ischemic work-up including stress testing and echocardiogram  Evaluate after testing        The following portions of the patient's history were reviewed and updated as appropriate: allergies, current medications, past family history, past medical  history, past social history, past surgical history and problem list.    REVIEW OF SYSTEMS:    Review of Systems   Cardiovascular: Positive for chest pain and dyspnea on exertion.   All other systems reviewed and are negative.      Vitals:    10/08/20 1430   BP: 126/77   Pulse: 86   SpO2: 97%         PHYSICAL EXAM:    General: Well-developed, well-nourished 85-year-old  female who is alert, cooperative, no distress, appears stated age  Head:  Normocephalic, atraumatic, mucous membranes moist  Eyes:  Conjunctiva/corneas clear, EOM's intact     Neck:  Supple,  no JVD or bruit     Lungs: Clear to auscultation bilaterally, no wheezes rhonchi rales are noted  Chest wall: No tenderness  Musculoskeletal:   Ambulates slowly and with great difficulty  Heart::  Regular rate and rhythm, S1 and S2 normal, soft murmur to the base  Abdomen: Soft, non-tender, nondistended, bowel sounds active, no abdominal bruit  Extremities: No cyanosis, clubbing, or edema   Pulses: 2+ and symmetric all extremities  Skin:  No rashes or lesions  Neuro/psych: A&O x3. CN II through XII are grossly intact with appropriate affect        Past Medical History:   Diagnosis Date   • Anxiety disorder    • Arthritis    • Coronary artery disease    • Diabetes mellitus (CMS/HCC)    • Disc disorder    • Factor 5 Leiden mutation, heterozygous (CMS/HCC)    • GERD (gastroesophageal reflux disease)    • Hiatal hernia    • History of blood clots 2020    superficial in right leg    • Hyperlipidemia    • Hypertension    • Neuropathy    • Osteoporosis    • Valvular disease        Past Surgical History:   Procedure Laterality Date   • ABDOMINAL SURGERY     • BLADDER REPAIR     • BREAST BIOPSY Right    • CARDIAC CATHETERIZATION     • CATARACT EXTRACTION     • CHOLECYSTECTOMY     • CORONARY ANGIOPLASTY     • HYSTERECTOMY      partial    • TOTAL KNEE ARTHROPLASTY Left          Current Outpatient Medications:   •  aspirin (ASPIR-LOW) 81 MG EC tablet, ASPIR-LOW 81  "MG TBEC, Disp: , Rfl:   •  Calcium Citrate-Vitamin D 1000-400 liquid, CITRACAL/VITAMIN D 250-200 MG-UNIT ORAL TABLET, Disp: , Rfl:   •  Cholecalciferol (VITAMIN D3) 2000 units capsule, VITAMIN D3 2000 UNIT CAPS, Disp: , Rfl:   •  dexlansoprazole (DEXILANT) 60 MG capsule, Daily., Disp: , Rfl:   •  dilTIAZem CD (CARDIZEM CD) 300 MG 24 hr capsule, TAKE 1 CAPSULE BY MOUTH EVERY DAY, Disp: 90 capsule, Rfl: 1  •  isosorbide mononitrate (IMDUR) 60 MG 24 hr tablet, TAKE 1 TABLET BY MOUTH EVERY DAY, Disp: 90 tablet, Rfl: 2  •  methocarbamol (ROBAXIN) 750 MG tablet, TAKE 1 TABLET BY MOUTH 4 TIMES A DAY AS NEEDED, Disp: , Rfl:   •  Multiple Vitamins-Minerals (MULTIVITAMIN ADULTS) tablet, Take  by mouth Daily., Disp: , Rfl:   •  nitroglycerin (NITROSTAT) 0.4 MG SL tablet, TAKE TABLETS BY MOUTH AS DIRECTED, Disp: 75 tablet, Rfl: 1  •  rosuvastatin (CRESTOR) 10 MG tablet, Take 10 mg by mouth Daily., Disp: , Rfl:   •  sucralfate (CARAFATE) 1 g tablet, Take 1 g by mouth 4 (Four) Times a Day., Disp: , Rfl:   •  venlafaxine XR (EFFEXOR-XR) 75 MG 24 hr capsule, Take 75 mg by mouth Daily., Disp: , Rfl: 4  •  zoledronic acid (RECLAST) 5 MG/100ML solution infusion, RECLAST 5 MG/100ML SOLN, Disp: , Rfl:     Allergies   Allergen Reactions   • Erythromycin GI Intolerance       Family History   Problem Relation Age of Onset   • Stroke Mother    • Stroke Sister    • Stroke Sister    • Heart attack Daughter    • Heart disease Daughter        Social History     Tobacco Use   • Smoking status: Never Smoker   • Smokeless tobacco: Never Used   Substance Use Topics   • Alcohol use: Yes     Frequency: Never     Comment: occasional wine            Current Electrocardiogram:    ECG 12 Lead    Date/Time: 10/8/2020 4:30 PM  Performed by: Maggy Connell APRN  Authorized by: Maggy Connell APRN   Comparison: not compared with previous ECG   BPM: 82  QRS axis: right                  EMR Dragon/Transcription:   \"Dictated utilizing Dragon " "dictation\".         "

## 2020-10-12 PROBLEM — R06.02 SHORTNESS OF BREATH: Status: ACTIVE | Noted: 2020-10-12

## 2020-10-30 ENCOUNTER — HOSPITAL ENCOUNTER (OUTPATIENT)
Dept: CARDIOLOGY | Facility: HOSPITAL | Age: 85
Discharge: HOME | End: 2020-10-30

## 2020-10-30 VITALS
HEART RATE: 73 BPM | HEIGHT: 55 IN | SYSTOLIC BLOOD PRESSURE: 162 MMHG | DIASTOLIC BLOOD PRESSURE: 82 MMHG | WEIGHT: 131 LBS | BODY MASS INDEX: 30.32 KG/M2

## 2020-10-30 DIAGNOSIS — I25.10 CORONARY ARTERY DISEASE INVOLVING NATIVE CORONARY ARTERY OF NATIVE HEART WITHOUT ANGINA PECTORIS: ICD-10-CM

## 2020-10-30 DIAGNOSIS — R07.89 CHEST DISCOMFORT: ICD-10-CM

## 2020-10-30 DIAGNOSIS — I34.9 NONRHEUMATIC MITRAL VALVE DISORDER: ICD-10-CM

## 2020-10-30 PROCEDURE — 93018 CV STRESS TEST I&R ONLY: CPT | Performed by: INTERNAL MEDICINE

## 2020-10-30 PROCEDURE — 93306 TTE W/DOPPLER COMPLETE: CPT

## 2020-10-30 PROCEDURE — 93017 CV STRESS TEST TRACING ONLY: CPT

## 2020-10-30 PROCEDURE — 78452 HT MUSCLE IMAGE SPECT MULT: CPT | Performed by: INTERNAL MEDICINE

## 2020-10-30 PROCEDURE — A9500 TC99M SESTAMIBI: HCPCS | Performed by: NURSE PRACTITIONER

## 2020-10-30 PROCEDURE — 93306 TTE W/DOPPLER COMPLETE: CPT | Performed by: INTERNAL MEDICINE

## 2020-10-30 PROCEDURE — 25010000002 REGADENOSON 0.4 MG/5ML SOLUTION: Performed by: NURSE PRACTITIONER

## 2020-10-30 PROCEDURE — 93016 CV STRESS TEST SUPVJ ONLY: CPT | Performed by: INTERNAL MEDICINE

## 2020-10-30 PROCEDURE — 0 TECHNETIUM SESTAMIBI: Performed by: NURSE PRACTITIONER

## 2020-10-30 PROCEDURE — 78452 HT MUSCLE IMAGE SPECT MULT: CPT

## 2020-10-30 RX ADMIN — TECHNETIUM TC 99M SESTAMIBI 1 DOSE: 1 INJECTION INTRAVENOUS at 11:00

## 2020-10-30 RX ADMIN — TECHNETIUM TC 99M SESTAMIBI 1 DOSE: 1 INJECTION INTRAVENOUS at 08:25

## 2020-10-30 RX ADMIN — REGADENOSON 0.4 MG: 0.08 INJECTION, SOLUTION INTRAVENOUS at 11:00

## 2020-11-02 LAB
ASCENDING AORTA: 2.5 CM
BH CV ECHO MEAS - ACS: 1.5 CM
BH CV ECHO MEAS - AO MAX PG (FULL): 7.1 MMHG
BH CV ECHO MEAS - AO MAX PG: 18.1 MMHG
BH CV ECHO MEAS - AO MEAN PG (FULL): 4.1 MMHG
BH CV ECHO MEAS - AO MEAN PG: 10.8 MMHG
BH CV ECHO MEAS - AO ROOT AREA (BSA CORRECTED): 1.8
BH CV ECHO MEAS - AO ROOT AREA: 5.7 CM^2
BH CV ECHO MEAS - AO ROOT DIAM: 2.7 CM
BH CV ECHO MEAS - AO V2 MAX: 212.4 CM/SEC
BH CV ECHO MEAS - AO V2 MEAN: 158.2 CM/SEC
BH CV ECHO MEAS - AO V2 VTI: 49.1 CM
BH CV ECHO MEAS - ASC AORTA: 2.5 CM
BH CV ECHO MEAS - AVA(I,A): 2.9 CM^2
BH CV ECHO MEAS - AVA(I,D): 2.9 CM^2
BH CV ECHO MEAS - AVA(V,A): 2.8 CM^2
BH CV ECHO MEAS - AVA(V,D): 2.8 CM^2
BH CV ECHO MEAS - BSA(HAYCOCK): 1.5 M^2
BH CV ECHO MEAS - BSA: 1.5 M^2
BH CV ECHO MEAS - BZI_BMI: 30.4 KILOGRAMS/M^2
BH CV ECHO MEAS - BZI_METRIC_HEIGHT: 139.7 CM
BH CV ECHO MEAS - BZI_METRIC_WEIGHT: 59.4 KG
BH CV ECHO MEAS - EDV(CUBED): 45.7 ML
BH CV ECHO MEAS - EDV(MOD-SP2): 53.8 ML
BH CV ECHO MEAS - EDV(MOD-SP4): 62.9 ML
BH CV ECHO MEAS - EDV(TEICH): 53.6 ML
BH CV ECHO MEAS - EF(CUBED): 66.6 %
BH CV ECHO MEAS - EF(MOD-BP): 68 %
BH CV ECHO MEAS - EF(MOD-SP2): 65.5 %
BH CV ECHO MEAS - EF(MOD-SP4): 69.4 %
BH CV ECHO MEAS - EF(TEICH): 59.1 %
BH CV ECHO MEAS - ESV(CUBED): 15.3 ML
BH CV ECHO MEAS - ESV(MOD-SP2): 18.6 ML
BH CV ECHO MEAS - ESV(MOD-SP4): 19.3 ML
BH CV ECHO MEAS - ESV(TEICH): 21.9 ML
BH CV ECHO MEAS - FS: 30.6 %
BH CV ECHO MEAS - IVS/LVPW: 1.2
BH CV ECHO MEAS - IVSD: 1 CM
BH CV ECHO MEAS - LA DIMENSION(2D): 3.8 CM
BH CV ECHO MEAS - LA DIMENSION: 3.3 CM
BH CV ECHO MEAS - LA/AO: 1.2
BH CV ECHO MEAS - LAT PEAK E' VEL: 7 CM/SEC
BH CV ECHO MEAS - LV DIASTOLIC VOL/BSA (35-75): 43 ML/M^2
BH CV ECHO MEAS - LV IVRT: 0.08 SEC
BH CV ECHO MEAS - LV MASS(C)D: 100.9 GRAMS
BH CV ECHO MEAS - LV MASS(C)DI: 68.9 GRAMS/M^2
BH CV ECHO MEAS - LV MAX PG: 11 MMHG
BH CV ECHO MEAS - LV MEAN PG: 6.6 MMHG
BH CV ECHO MEAS - LV SYSTOLIC VOL/BSA (12-30): 13.2 ML/M^2
BH CV ECHO MEAS - LV V1 MAX: 165.5 CM/SEC
BH CV ECHO MEAS - LV V1 MEAN: 124.5 CM/SEC
BH CV ECHO MEAS - LV V1 VTI: 40.3 CM
BH CV ECHO MEAS - LVIDD: 3.6 CM
BH CV ECHO MEAS - LVIDS: 2.5 CM
BH CV ECHO MEAS - LVOT AREA: 3.5 CM^2
BH CV ECHO MEAS - LVOT DIAM: 2.1 CM
BH CV ECHO MEAS - LVPWD: 0.89 CM
BH CV ECHO MEAS - MED PEAK E' VEL: 6 CM/SEC
BH CV ECHO MEAS - MV A MAX VEL: 124.2 CM/SEC
BH CV ECHO MEAS - MV DEC SLOPE: 405 CM/SEC^2
BH CV ECHO MEAS - MV DEC TIME: 0.22 SEC
BH CV ECHO MEAS - MV E MAX VEL: 89.5 CM/SEC
BH CV ECHO MEAS - MV E/A: 0.72
BH CV ECHO MEAS - MV MAX PG: 7.4 MMHG
BH CV ECHO MEAS - MV MEAN PG: 2.4 MMHG
BH CV ECHO MEAS - MV P1/2T: 64 MSEC
BH CV ECHO MEAS - MV V2 MAX: 136.1 CM/SEC
BH CV ECHO MEAS - MV V2 MEAN: 72.1 CM/SEC
BH CV ECHO MEAS - MV V2 VTI: 25.8 CM
BH CV ECHO MEAS - MVA(P1/2T): 3.5 CM2
BH CV ECHO MEAS - MVA(VTI): 5.5 CM^2
BH CV ECHO MEAS - PA ACC SLOPE: 650 CM/SEC2
BH CV ECHO MEAS - PA ACC TIME: 0.08 SEC
BH CV ECHO MEAS - PA MAX PG (FULL): 2 MMHG
BH CV ECHO MEAS - PA MAX PG: 3.5 MMHG
BH CV ECHO MEAS - PA MEAN PG (FULL): 0.75 MMHG
BH CV ECHO MEAS - PA MEAN PG: 1.6 MMHG
BH CV ECHO MEAS - PA PR(ACCEL): 41.8 MMHG
BH CV ECHO MEAS - PA V2 MAX: 93.4 CM/SEC
BH CV ECHO MEAS - PA V2 MEAN: 60.8 CM/SEC
BH CV ECHO MEAS - PA V2 VTI: 20.8 CM
BH CV ECHO MEAS - PAPD(PI EDV): 14 MMHG
BH CV ECHO MEAS - PI END-D VEL: 119.6 CM/SEC
BH CV ECHO MEAS - PI MAX PG: 12.4 MMHG
BH CV ECHO MEAS - PI MAX VEL: 176.3 CM/SEC
BH CV ECHO MEAS - PULM A REVS DUR: 0.09 SEC
BH CV ECHO MEAS - PULM A REVS VEL: 27.1 CM/SEC
BH CV ECHO MEAS - PULM DIAS VEL: 22.2 CM/SEC
BH CV ECHO MEAS - PULM S/D: 2.6
BH CV ECHO MEAS - PULM SYS VEL: 57.7 CM/SEC
BH CV ECHO MEAS - PVA(I,A): 2.3 CM^2
BH CV ECHO MEAS - PVA(I,D): 2.3 CM^2
BH CV ECHO MEAS - PVA(V,A): 2.1 CM^2
BH CV ECHO MEAS - PVA(V,D): 2.1 CM^2
BH CV ECHO MEAS - QP/QS: 0.34
BH CV ECHO MEAS - RAP SYSTOLE: 8 MMHG
BH CV ECHO MEAS - RV MAX PG: 1.5 MMHG
BH CV ECHO MEAS - RV MEAN PG: 0.89 MMHG
BH CV ECHO MEAS - RV V1 MAX: 61.9 CM/SEC
BH CV ECHO MEAS - RV V1 MEAN: 45.5 CM/SEC
BH CV ECHO MEAS - RV V1 VTI: 15 CM
BH CV ECHO MEAS - RVOT AREA: 3.2 CM^2
BH CV ECHO MEAS - RVOT DIAM: 2 CM
BH CV ECHO MEAS - RVSP: 38.9 MMHG
BH CV ECHO MEAS - SI(AO): 191.3 ML/M^2
BH CV ECHO MEAS - SI(CUBED): 20.8 ML/M^2
BH CV ECHO MEAS - SI(LVOT): 97.3 ML/M^2
BH CV ECHO MEAS - SI(MOD-SP2): 24.1 ML/M^2
BH CV ECHO MEAS - SI(MOD-SP4): 29.8 ML/M^2
BH CV ECHO MEAS - SI(TEICH): 21.6 ML/M^2
BH CV ECHO MEAS - SV(AO): 280.1 ML
BH CV ECHO MEAS - SV(CUBED): 30.5 ML
BH CV ECHO MEAS - SV(LVOT): 142.5 ML
BH CV ECHO MEAS - SV(MOD-SP2): 35.3 ML
BH CV ECHO MEAS - SV(MOD-SP4): 43.7 ML
BH CV ECHO MEAS - SV(RVOT): 48.5 ML
BH CV ECHO MEAS - SV(TEICH): 31.7 ML
BH CV ECHO MEAS - TAPSE (>1.6): 2.1 CM
BH CV ECHO MEAS - TR MAX PG: 31 MMHG
BH CV ECHO MEAS - TR MAX VEL: 278.1 CM/SEC
BH CV ECHO MEASUREMENTS AVERAGE E/E' RATIO: 13.77
BH CV XLRA - RV BASE: 3.8 CM
BH CV XLRA - RV MID: 2.9 CM
BH CV XLRA - TDI S': 10 CM/SEC
IVRT: 79 MSEC
LEFT ATRIUM VOLUME INDEX: 39 ML/M2
LEFT ATRIUM VOLUME: 57 CM3
PV VALVE AREA: 2.3 CM2

## 2020-11-03 LAB
BH CV STRESS BP STAGE 1: NORMAL
BH CV STRESS COMMENTS STAGE 1: NORMAL
BH CV STRESS DOSE REGADENOSON STAGE 1: 0.4
BH CV STRESS DURATION MIN STAGE 1: 0
BH CV STRESS DURATION SEC STAGE 1: 10
BH CV STRESS HR STAGE 1: 98
BH CV STRESS PROTOCOL 1: NORMAL
BH CV STRESS RECOVERY BP: NORMAL MMHG
BH CV STRESS RECOVERY HR: 103 BPM
BH CV STRESS STAGE 1: 1
LV EF NUC BP: 81 %
MAXIMAL PREDICTED HEART RATE: 135 BPM
PERCENT MAX PREDICTED HR: 72.59 %
STRESS BASELINE BP: NORMAL MMHG
STRESS BASELINE HR: 74 BPM
STRESS PERCENT HR: 85 %
STRESS POST PEAK BP: NORMAL MMHG
STRESS POST PEAK HR: 98 BPM
STRESS TARGET HR: 115 BPM

## 2020-11-05 NOTE — PROGRESS NOTES
[facility] CARDIOLOGY      REASON FOR FOLLOW-UP:  Follow-up stress test and echo          Chief Complaint   Patient presents with   • Hypertension     f/u no cardiac complaints  Recent Echo and stress    • Hyperlipidemia   • Coronary Artery Disease         Dear Dr. Bryant,        History of Present Illness     I had the pleasure of seeing Renuka Rodrigez today. As you are aware, this is an 85 y.o. female with an established history of ischemic heart disease.  She underwent PCI of her left anterior descending artery in 2008.  She has additional history that includes hypertension, hypertensive cardiovascular disease, hyperlipidemia, antiplatelet therapy, and venous thrombosis.    During last office visit, she reported over the past 3 weeks she has had 3 episodes of midsternal chest discomfort described as a heaviness that radiates into her left shoulder.  She denied any associated diaphoresis, nausea or vomiting, dizziness or lightheadedness.  Patient cares for her handicapped daughter at home and had noticed shortness of breath with activity as well.  She denied any lower extremity edema.    She underwent full ischemic work-up with 2D echo 10/8/2020 that showed normal LV systolic function and EF 66-70%, mild MR/TR/AR, grade 1A diastolic dysfunction.  Nuclear stress testing with no evidence of ischemia, normal ECG stress test and low risk study.  She presents today in follow-up for these diagnostics.    Today, the patient reports she has had couple episodes as described above.  No worsening of her symptoms.  Overall, she is a very highly functioning 85-year-old female.  I reviewed results of diagnostics with her in detail today.  Her symptoms are very atypical and given results of diagnostics I do not believe these are cardiac in nature.  Her blood pressure is well controlled at 129/77.        Assessment:  Chest pain  Shortness of breath  History of PCI to the LAD  History of valvular heart  disease  History of hypertension with hypertensive cardiovascular disease  Dyslipidemia  Antiplatelet therapy     Recommendations  Continue current medical therapy  Call if symptoms worsen  Otherwise, follow-up in 6 months or sooner if needed    The following portions of the patient's history were reviewed and updated as appropriate: allergies, current medications, past family history, past medical history, past social history, past surgical history and problem list.    REVIEW OF SYSTEMS:    Review of Systems   Constitution: Positive for malaise/fatigue.   Cardiovascular: Positive for chest pain and dyspnea on exertion.   All other systems reviewed and are negative.      Vitals:    11/09/20 1430   BP: 129/77   Pulse: 87   SpO2: 95%         PHYSICAL EXAM:    General: Well-developed, well-nourished 85-year-old  female who is alert, cooperative, no distress, appears stated age  Head:  Normocephalic, atraumatic, mucous membranes moist  Eyes:  Conjunctiva/corneas clear, EOM's intact     Neck:  Supple,  no JVD or bruit     Lungs: Clear to auscultation bilaterally, no wheezes rhonchi rales are noted  Chest wall: No tenderness  Musculoskeletal:   Ambulates slowly with no assistive device  Heart::  Regular rate and rhythm, S1 and S2 normal, no murmur, rub or gallop  Abdomen: Soft, non-tender, nondistended, bowel sounds active, no abdominal bruit  Extremities: No cyanosis, clubbing, or edema.  Patient does have lower extremity varicosities  Pulses: 2+ and symmetric all extremities  Skin:  No rashes or lesions  Neuro/psych: A&O x3. CN II through XII are grossly intact with appropriate affect        Past Medical History:   Diagnosis Date   • Anxiety disorder    • Arthritis    • Coronary artery disease    • Diabetes mellitus (CMS/HCC)    • Disc disorder    • Factor 5 Leiden mutation, heterozygous (CMS/[facility])    • GERD (gastroesophageal reflux disease)    • Hiatal hernia    • History of blood clots 2020    superficial in  right leg    • Hyperlipidemia    • Hypertension    • Neuropathy    • Osteoporosis    • Valvular disease        Past Surgical History:   Procedure Laterality Date   • ABDOMINAL SURGERY     • BLADDER REPAIR     • BREAST BIOPSY Right    • CARDIAC CATHETERIZATION     • CATARACT EXTRACTION     • CHOLECYSTECTOMY     • CORONARY ANGIOPLASTY     • HYSTERECTOMY      partial    • TOTAL KNEE ARTHROPLASTY Left          Current Outpatient Medications:   •  aspirin (ASPIR-LOW) 81 MG EC tablet, ASPIR-LOW 81 MG TBEC, Disp: , Rfl:   •  Calcium Citrate-Vitamin D 1000-400 liquid, CITRACAL/VITAMIN D 250-200 MG-UNIT ORAL TABLET, Disp: , Rfl:   •  Cholecalciferol (VITAMIN D3) 2000 units capsule, VITAMIN D3 2000 UNIT CAPS, Disp: , Rfl:   •  dexlansoprazole (DEXILANT) 60 MG capsule, Daily., Disp: , Rfl:   •  dilTIAZem CD (CARDIZEM CD) 300 MG 24 hr capsule, TAKE 1 CAPSULE BY MOUTH EVERY DAY, Disp: 90 capsule, Rfl: 1  •  isosorbide mononitrate (IMDUR) 60 MG 24 hr tablet, TAKE 1 TABLET BY MOUTH EVERY DAY, Disp: 90 tablet, Rfl: 2  •  methocarbamol (ROBAXIN) 750 MG tablet, TAKE 1 TABLET BY MOUTH 4 TIMES A DAY AS NEEDED, Disp: , Rfl:   •  Multiple Vitamins-Minerals (MULTIVITAMIN ADULTS) tablet, Take  by mouth Daily., Disp: , Rfl:   •  nitroglycerin (NITROSTAT) 0.4 MG SL tablet, TAKE TABLETS BY MOUTH AS DIRECTED, Disp: 75 tablet, Rfl: 1  •  rosuvastatin (CRESTOR) 10 MG tablet, Take 10 mg by mouth Daily., Disp: , Rfl:   •  sucralfate (CARAFATE) 1 g tablet, Take 1 g by mouth 4 (Four) Times a Day., Disp: , Rfl:   •  venlafaxine XR (EFFEXOR-XR) 75 MG 24 hr capsule, Take 75 mg by mouth Daily., Disp: , Rfl: 4  •  zoledronic acid (RECLAST) 5 MG/100ML solution infusion, RECLAST 5 MG/100ML SOLN, Disp: , Rfl:     Allergies   Allergen Reactions   • Erythromycin GI Intolerance       Family History   Problem Relation Age of Onset   • Stroke Mother    • Stroke Sister    • Stroke Sister    • Heart attack Daughter    • Heart disease Daughter        Social History  "    Tobacco Use   • Smoking status: Never Smoker   • Smokeless tobacco: Never Used   Substance Use Topics   • Alcohol use: Yes     Frequency: Never     Comment: occasional wine            Current Electrocardiogram:  Procedures        EMR Dragon/Transcription:   \"Dictated utilizing Dragon dictation\".         "

## 2020-11-09 ENCOUNTER — OFFICE VISIT (OUTPATIENT)
Dept: CARDIOLOGY | Facility: CLINIC | Age: 85
End: 2020-11-09

## 2020-11-09 VITALS
OXYGEN SATURATION: 95 % | DIASTOLIC BLOOD PRESSURE: 77 MMHG | HEART RATE: 87 BPM | BODY MASS INDEX: 30.45 KG/M2 | WEIGHT: 131 LBS | SYSTOLIC BLOOD PRESSURE: 129 MMHG

## 2020-11-09 DIAGNOSIS — R06.02 SHORTNESS OF BREATH: ICD-10-CM

## 2020-11-09 DIAGNOSIS — Z79.02 LONG TERM (CURRENT) USE OF ANTITHROMBOTICS/ANTIPLATELETS: ICD-10-CM

## 2020-11-09 DIAGNOSIS — E78.2 MIXED HYPERLIPIDEMIA: ICD-10-CM

## 2020-11-09 DIAGNOSIS — I25.10 CORONARY ARTERY DISEASE INVOLVING NATIVE CORONARY ARTERY OF NATIVE HEART WITHOUT ANGINA PECTORIS: Primary | ICD-10-CM

## 2020-11-09 DIAGNOSIS — R07.89 CHEST DISCOMFORT: ICD-10-CM

## 2020-11-09 DIAGNOSIS — I10 ESSENTIAL HYPERTENSION: ICD-10-CM

## 2020-11-09 DIAGNOSIS — R01.1 HEART MURMUR: ICD-10-CM

## 2020-11-09 PROCEDURE — 99213 OFFICE O/P EST LOW 20 MIN: CPT | Performed by: NURSE PRACTITIONER

## 2020-11-10 PROBLEM — E78.2 MIXED HYPERLIPIDEMIA: Status: ACTIVE | Noted: 2020-03-04

## 2020-11-18 ENCOUNTER — ON CAMPUS - OUTPATIENT (OUTPATIENT)
Dept: URBAN - METROPOLITAN AREA HOSPITAL 77 | Facility: HOSPITAL | Age: 85
End: 2020-11-18
Payer: COMMERCIAL

## 2020-11-18 DIAGNOSIS — K44.9 DIAPHRAGMATIC HERNIA WITHOUT OBSTRUCTION OR GANGRENE: ICD-10-CM

## 2020-11-18 DIAGNOSIS — R13.10 DYSPHAGIA, UNSPECIFIED: ICD-10-CM

## 2020-11-18 DIAGNOSIS — K21.9 GASTRO-ESOPHAGEAL REFLUX DISEASE WITHOUT ESOPHAGITIS: ICD-10-CM

## 2020-11-18 PROCEDURE — 43450 DILATE ESOPHAGUS 1/MULT PASS: CPT | Performed by: INTERNAL MEDICINE

## 2020-11-18 PROCEDURE — 43235 EGD DIAGNOSTIC BRUSH WASH: CPT | Performed by: INTERNAL MEDICINE

## 2020-12-07 ENCOUNTER — OFFICE (OUTPATIENT)
Dept: URBAN - METROPOLITAN AREA CLINIC 64 | Facility: CLINIC | Age: 85
End: 2020-12-07

## 2020-12-07 VITALS
WEIGHT: 134 LBS | HEART RATE: 71 BPM | HEIGHT: 56 IN | SYSTOLIC BLOOD PRESSURE: 131 MMHG | DIASTOLIC BLOOD PRESSURE: 68 MMHG

## 2020-12-07 DIAGNOSIS — K21.9 GASTRO-ESOPHAGEAL REFLUX DISEASE WITHOUT ESOPHAGITIS: ICD-10-CM

## 2020-12-07 DIAGNOSIS — R11.10 VOMITING, UNSPECIFIED: ICD-10-CM

## 2020-12-07 DIAGNOSIS — K44.9 DIAPHRAGMATIC HERNIA WITHOUT OBSTRUCTION OR GANGRENE: ICD-10-CM

## 2020-12-07 PROCEDURE — 99213 OFFICE O/P EST LOW 20 MIN: CPT | Performed by: INTERNAL MEDICINE

## 2021-01-14 RX ORDER — ISOSORBIDE MONONITRATE 60 MG/1
TABLET, EXTENDED RELEASE ORAL
Qty: 90 TABLET | Refills: 2 | Status: SHIPPED | OUTPATIENT
Start: 2021-01-14 | End: 2021-09-27

## 2021-02-25 RX ORDER — DILTIAZEM HYDROCHLORIDE 300 MG/1
CAPSULE, COATED, EXTENDED RELEASE ORAL
Qty: 90 CAPSULE | Refills: 1 | Status: SHIPPED | OUTPATIENT
Start: 2021-02-25 | End: 2021-02-26

## 2021-02-26 RX ORDER — DILTIAZEM HYDROCHLORIDE 300 MG/1
CAPSULE, COATED, EXTENDED RELEASE ORAL
Qty: 60 CAPSULE | Refills: 1 | Status: SHIPPED | OUTPATIENT
Start: 2021-02-26 | End: 2021-12-17

## 2021-05-10 ENCOUNTER — OFFICE VISIT (OUTPATIENT)
Dept: CARDIOLOGY | Facility: CLINIC | Age: 86
End: 2021-05-10

## 2021-05-10 VITALS
WEIGHT: 129 LBS | SYSTOLIC BLOOD PRESSURE: 120 MMHG | RESPIRATION RATE: 18 BRPM | BODY MASS INDEX: 29.85 KG/M2 | DIASTOLIC BLOOD PRESSURE: 74 MMHG | OXYGEN SATURATION: 96 % | HEART RATE: 70 BPM | HEIGHT: 55 IN

## 2021-05-10 DIAGNOSIS — I25.10 CORONARY ARTERY DISEASE INVOLVING NATIVE CORONARY ARTERY OF NATIVE HEART WITHOUT ANGINA PECTORIS: Primary | ICD-10-CM

## 2021-05-10 DIAGNOSIS — R01.1 HEART MURMUR: ICD-10-CM

## 2021-05-10 DIAGNOSIS — E78.2 MIXED HYPERLIPIDEMIA: ICD-10-CM

## 2021-05-10 DIAGNOSIS — I10 ESSENTIAL HYPERTENSION: ICD-10-CM

## 2021-05-10 DIAGNOSIS — Z87.19 H/O HIATAL HERNIA: ICD-10-CM

## 2021-05-10 DIAGNOSIS — R07.89 CHEST DISCOMFORT: ICD-10-CM

## 2021-05-10 PROCEDURE — 99213 OFFICE O/P EST LOW 20 MIN: CPT | Performed by: NURSE PRACTITIONER

## 2021-05-10 PROCEDURE — 93000 ELECTROCARDIOGRAM COMPLETE: CPT | Performed by: NURSE PRACTITIONER

## 2021-05-10 NOTE — PROGRESS NOTES
[facility] CARDIOLOGY      REASON FOR FOLLOW-UP:            Chief Complaint   Patient presents with   • Coronary Artery Disease         Dear Manny, Yosef Madera MD        History of Present Illness     I had the pleasure of seeing Renuka Rodrigez today. As you are aware, this is an 86 y.o. female with an established history of ischemic heart disease.  She underwent PCI of her left anterior descending artery in 2008.  She has additional history that includes hypertension, hypertensive cardiovascular disease, hyperlipidemia, antiplatelet therapy, and venous thrombosis.  She underwent full ischemic work-up with 2D echo 10/8/2020 that showed normal LV systolic function and EF 66-70%, mild MR/TR/AR, grade 1A diastolic dysfunction.  Nuclear stress testing with no evidence of ischemia, normal ECG stress test and low risk study.  She presents today in follow-up for these diagnostics.     Today, the patient reports that she feels well.  She did sustain a mechanical fall 4 weeks ago while helping her daughter out a school bus.  She sustained a large bruise from thigh to mid calf on the left leg that is healing.  She does have small. Overall, she is a very highly functioning 86-year-old female.    She does report some intermittent midsternal chest discomfort that she now believes is due to her large hiatal hernia.  EKG in the office today shows normal sinus rhythm.  Her blood pressure is under excellent control at 120/74        ASSESSMENT:  Chest pain  Shortness of breath  History of PCI to the LAD  History of valvular heart disease  History of hypertension with hypertensive cardiovascular disease  Dyslipidemia  History of hiatal hernia  History of esophageal stricture  Antiplatelet therapy    PLAN:  Continue current CV plan of care  Follow-up in 6 months or sooner if needed        The following portions of the patient's history were reviewed and updated as appropriate: allergies, current  medications, past family history, past medical history, past social history, past surgical history and problem list.    REVIEW OF SYSTEMS:    Review of Systems   Gastrointestinal:        GERD   All other systems reviewed and are negative.      Vitals:    05/10/21 1325   BP: 120/74   Pulse: 70   Resp: 18   SpO2: 96%         PHYSICAL EXAM:    General: Well-developed, highly functioning 86-year-old female who is alert, cooperative, no distress, appears stated age  Head:  Normocephalic, atraumatic, mucous membranes moist  Eyes:  Conjunctiva/corneas clear, EOM's intact     Neck:  Supple,  no JVD or bruit     Lungs: Clear to auscultation bilaterally, no wheezes rhonchi rales are noted  Chest wall: No tenderness  Musculoskeletal:   Ambulates freely without assistance  Heart::  Regular rate and rhythm, S1 and S2 normal, 2/6 holosystolic murmur  Abdomen: Soft, non-tender, nondistended, bowel sounds active, no abdominal bruit  Extremities: Large healing bruise from left upper thigh to mid calf  Pulses: 2+ and symmetric all extremities  Skin:  No rashes or lesions  Neuro/psych: A&O x3. CN II through XII are grossly intact with appropriate affect        Past Medical History:   Diagnosis Date   • Anxiety disorder    • Arthritis    • Coronary artery disease    • Diabetes mellitus (CMS/HCC)    • Disc disorder    • Factor 5 Leiden mutation, heterozygous (CMS/HCC)    • GERD (gastroesophageal reflux disease)    • Hiatal hernia    • History of blood clots 2020    superficial in right leg    • Hyperlipidemia    • Hypertension    • Neuropathy    • Osteoporosis    • Valvular disease        Past Surgical History:   Procedure Laterality Date   • ABDOMINAL SURGERY     • BLADDER REPAIR     • BREAST BIOPSY Right    • CARDIAC CATHETERIZATION     • CATARACT EXTRACTION     • CHOLECYSTECTOMY     • CORONARY ANGIOPLASTY     • HYSTERECTOMY      partial    • TOTAL KNEE ARTHROPLASTY Left          Current Outpatient Medications:   •  aspirin  (ASPIR-LOW) 81 MG EC tablet, ASPIR-LOW 81 MG TBEC, Disp: , Rfl:   •  Calcium Citrate-Vitamin D 1000-400 liquid, CITRACAL/VITAMIN D 250-200 MG-UNIT ORAL TABLET, Disp: , Rfl:   •  Cholecalciferol (VITAMIN D3) 2000 units capsule, VITAMIN D3 2000 UNIT CAPS, Disp: , Rfl:   •  dexlansoprazole (Dexilant) 60 MG capsule, Daily., Disp: , Rfl:   •  dilTIAZem CD (CARDIZEM CD) 300 MG 24 hr capsule, TAKE 1 CAPSULE BY MOUTH EVERY DAY, Disp: 60 capsule, Rfl: 1  •  isosorbide mononitrate (IMDUR) 60 MG 24 hr tablet, TAKE 1 TABLET BY MOUTH EVERY DAY, Disp: 90 tablet, Rfl: 2  •  methocarbamol (ROBAXIN) 750 MG tablet, TAKE 1 TABLET BY MOUTH 4 TIMES A DAY AS NEEDED, Disp: , Rfl:   •  Multiple Vitamins-Minerals (MULTIVITAMIN ADULTS) tablet, Take  by mouth Daily., Disp: , Rfl:   •  nitroglycerin (NITROSTAT) 0.4 MG SL tablet, TAKE TABLETS BY MOUTH AS DIRECTED, Disp: 75 tablet, Rfl: 1  •  rosuvastatin (CRESTOR) 10 MG tablet, Take 10 mg by mouth Daily., Disp: , Rfl:   •  sucralfate (CARAFATE) 1 g tablet, Take 1 g by mouth 4 (Four) Times a Day., Disp: , Rfl:   •  venlafaxine XR (EFFEXOR-XR) 75 MG 24 hr capsule, Take 75 mg by mouth Daily., Disp: , Rfl: 4  •  zoledronic acid (RECLAST) 5 MG/100ML solution infusion, RECLAST 5 MG/100ML SOLN, Disp: , Rfl:     Allergies   Allergen Reactions   • Erythromycin GI Intolerance       Family History   Problem Relation Age of Onset   • Stroke Mother    • Stroke Sister    • Stroke Sister    • Heart attack Daughter    • Heart disease Daughter        Social History     Tobacco Use   • Smoking status: Never Smoker   • Smokeless tobacco: Never Used   Substance Use Topics   • Alcohol use: Yes     Comment: occasional wine            Current Electrocardiogram:    ECG 12 Lead    Date/Time: 5/10/2021 12:48 PM  Performed by: Maggy Connell APRN  Authorized by: Maggy Connell APRN   Comparison: not compared with previous ECG   Rhythm: sinus rhythm  BPM: 74  Q waves: aVF    Other findings: left ventricular  "hypertrophy                EMR Dragon/Transcription:   \"Dictated utilizing Dragon dictation\".       "

## 2021-05-11 PROBLEM — Z87.19 H/O HIATAL HERNIA: Status: ACTIVE | Noted: 2021-05-11

## 2021-09-21 ENCOUNTER — OFFICE (OUTPATIENT)
Dept: URBAN - METROPOLITAN AREA CLINIC 64 | Facility: CLINIC | Age: 86
End: 2021-09-21

## 2021-09-21 VITALS
HEIGHT: 56 IN | DIASTOLIC BLOOD PRESSURE: 83 MMHG | HEART RATE: 75 BPM | SYSTOLIC BLOOD PRESSURE: 131 MMHG | WEIGHT: 131 LBS

## 2021-09-21 DIAGNOSIS — R13.10 DYSPHAGIA, UNSPECIFIED: ICD-10-CM

## 2021-09-21 DIAGNOSIS — K21.9 GASTRO-ESOPHAGEAL REFLUX DISEASE WITHOUT ESOPHAGITIS: ICD-10-CM

## 2021-09-21 PROCEDURE — 99213 OFFICE O/P EST LOW 20 MIN: CPT | Performed by: INTERNAL MEDICINE

## 2021-09-21 RX ORDER — DEXLANSOPRAZOLE 60 MG/1
60 CAPSULE, DELAYED RELEASE ORAL
Qty: 90 | Refills: 3 | Status: ACTIVE
Start: 2021-09-21

## 2021-09-27 RX ORDER — ISOSORBIDE MONONITRATE 60 MG/1
TABLET, EXTENDED RELEASE ORAL
Qty: 90 TABLET | Refills: 2 | Status: SHIPPED | OUTPATIENT
Start: 2021-09-27 | End: 2022-07-15 | Stop reason: SDUPTHER

## 2021-11-11 ENCOUNTER — OFFICE VISIT (OUTPATIENT)
Dept: CARDIOLOGY | Facility: CLINIC | Age: 86
End: 2021-11-11

## 2021-11-11 VITALS
SYSTOLIC BLOOD PRESSURE: 120 MMHG | BODY MASS INDEX: 29.85 KG/M2 | HEIGHT: 55 IN | DIASTOLIC BLOOD PRESSURE: 77 MMHG | WEIGHT: 129 LBS | HEART RATE: 74 BPM | OXYGEN SATURATION: 95 % | RESPIRATION RATE: 18 BRPM

## 2021-11-11 DIAGNOSIS — I10 ESSENTIAL HYPERTENSION: ICD-10-CM

## 2021-11-11 DIAGNOSIS — E78.2 MIXED HYPERLIPIDEMIA: ICD-10-CM

## 2021-11-11 DIAGNOSIS — I25.10 CORONARY ARTERY DISEASE INVOLVING NATIVE CORONARY ARTERY OF NATIVE HEART WITHOUT ANGINA PECTORIS: Primary | ICD-10-CM

## 2021-11-11 PROCEDURE — 99213 OFFICE O/P EST LOW 20 MIN: CPT | Performed by: NURSE PRACTITIONER

## 2021-11-11 NOTE — PROGRESS NOTES
[facility] CARDIOLOGY      REASON FOR FOLLOW-UP:  Coronary artery disease  Hypertension  Hypertensive cardiovascular disease  Dyslipidemia  Antiplatelet therapy          Chief Complaint   Patient presents with   • Coronary Artery Disease     Follow up         Dear Annamarie Almonte,         History of Present Illness     I had the pleasure of seeing Renuka Rodrigez today. As you are aware, this is an 86 y.o. female with an established history of ischemic heart disease.  She underwent PCI of her left anterior descending artery in 2008.  She has additional history that includes hypertension, hypertensive cardiovascular disease, hyperlipidemia, antiplatelet therapy, and venous thrombosis.  She underwent full ischemic work-up with 2D echo 10/8/2020 that showed normal LV systolic function and EF 66-70%, mild MR/TR/AR, grade 1A diastolic dysfunction.  Nuclear stress testing with no evidence of ischemia, normal ECG stress test and low risk study.  She presents today in follow-up for the above-mentioned diagnoses.    Today, Renuka reports that she feels well.  Specifically, she denies any complaints of chest pain, pressure, tightness or palpitations.  She denies any shortness of breath at rest, dyspnea with exertion, orthopnea or PND.  No lower extremity edema, dizziness or lightheadedness.  The patient has been caring for her disabled daughter for many years and is considering placing her in an assisted living.        ASSESSMENT:  Coronary artery disease  History of PCI-LAD  Valvular heart disease  Hypertension with hypertensive cardiovascular disease  Dyslipidemia  History of esophageal stricture  Antiplatelet therapy        PLAN:  Continue current CV plan of care  Follow-up in 6 months or sooner if needed        The following portions of the patient's history were reviewed and updated as appropriate: allergies, current medications, past family history, past medical history, past social  history, past surgical history and problem list.    REVIEW OF SYSTEMS:    Review of Systems   All other systems reviewed and are negative.      Vitals:    11/11/21 1315   BP: 120/77   Pulse: 74   Resp: 18   SpO2: 95%         PHYSICAL EXAM:    General: Alert, cooperative, no distress, appears stated age  Head:  Normocephalic, atraumatic, mucous membranes moist  Eyes:  Conjunctiva/corneas clear, EOM's intact     Neck:  Supple,  no JVD or bruit     Lungs: Clear to auscultation bilaterally, no wheezes rhonchi rales are noted  Chest wall: No tenderness  Musculoskeletal:   Ambulates freely without assistance  Heart::  Regular rate and rhythm, S1 and S2 normal, no murmur, rub or gallop  Abdomen: Soft, non-tender, nondistended, bowel sounds active, no abdominal bruit  Extremities: No cyanosis, clubbing, or edema   Pulses: 2+ and symmetric all extremities  Skin:  No rashes or lesions  Neuro/psych: A&O x3. CN II through XII are grossly intact with appropriate affect        Past Medical History:   Diagnosis Date   • Anxiety disorder    • Arthritis    • Coronary artery disease    • Diabetes mellitus (HCC)    • Disc disorder    • Factor 5 Leiden mutation, heterozygous (HCC)    • GERD (gastroesophageal reflux disease)    • Hiatal hernia    • History of blood clots 2020    superficial in right leg    • Hyperlipidemia    • Hypertension    • Neuropathy    • Osteoporosis    • Valvular disease        Past Surgical History:   Procedure Laterality Date   • ABDOMINAL SURGERY     • BLADDER REPAIR     • BREAST BIOPSY Right    • CARDIAC CATHETERIZATION     • CATARACT EXTRACTION     • CHOLECYSTECTOMY     • CORONARY ANGIOPLASTY     • HYSTERECTOMY      partial    • TOTAL KNEE ARTHROPLASTY Left          Current Outpatient Medications:   •  aspirin (ASPIR-LOW) 81 MG EC tablet, ASPIR-LOW 81 MG TBEC, Disp: , Rfl:   •  Calcium Citrate-Vitamin D 1000-400 liquid, CITRACAL/VITAMIN D 250-200 MG-UNIT ORAL TABLET, Disp: , Rfl:   •  Cholecalciferol  "(VITAMIN D3) 2000 units capsule, VITAMIN D3 2000 UNIT CAPS, Disp: , Rfl:   •  dexlansoprazole (Dexilant) 60 MG capsule, Daily., Disp: , Rfl:   •  dilTIAZem CD (CARDIZEM CD) 300 MG 24 hr capsule, TAKE 1 CAPSULE BY MOUTH EVERY DAY, Disp: 60 capsule, Rfl: 1  •  isosorbide mononitrate (IMDUR) 60 MG 24 hr tablet, TAKE 1 TABLET BY MOUTH EVERY DAY, Disp: 90 tablet, Rfl: 2  •  Multiple Vitamins-Minerals (MULTIVITAMIN ADULTS) tablet, Take  by mouth Daily., Disp: , Rfl:   •  nitroglycerin (NITROSTAT) 0.4 MG SL tablet, TAKE TABLETS BY MOUTH AS DIRECTED, Disp: 75 tablet, Rfl: 1  •  rosuvastatin (CRESTOR) 10 MG tablet, Take 10 mg by mouth Daily., Disp: , Rfl:   •  venlafaxine XR (EFFEXOR-XR) 75 MG 24 hr capsule, Take 75 mg by mouth Daily., Disp: , Rfl: 4  •  zoledronic acid (RECLAST) 5 MG/100ML solution infusion, RECLAST 5 MG/100ML SOLN, Disp: , Rfl:     Allergies   Allergen Reactions   • Erythromycin GI Intolerance       Family History   Problem Relation Age of Onset   • Stroke Mother    • Stroke Sister    • Stroke Sister    • Heart attack Daughter    • Heart disease Daughter        Social History     Tobacco Use   • Smoking status: Never Smoker   • Smokeless tobacco: Never Used   Substance Use Topics   • Alcohol use: Yes     Comment: occasional wine            Current Electrocardiogram:    ECG 12 Lead    Date/Time: 11/15/2021 5:05 PM  Performed by: Maggy Connell APRN  Authorized by: Maggy Connell APRN   Comparison: not compared with previous ECG   Rhythm: sinus rhythm  BPM: 71  Q waves: V1, V3 and III                    EMR Dragon/Transcription:   \"Dictated utilizing Dragon dictation\".       "

## 2021-11-15 PROCEDURE — 93000 ELECTROCARDIOGRAM COMPLETE: CPT | Performed by: NURSE PRACTITIONER

## 2021-12-17 DIAGNOSIS — I10 ESSENTIAL HYPERTENSION: ICD-10-CM

## 2021-12-17 DIAGNOSIS — I25.10 CORONARY ARTERY DISEASE INVOLVING NATIVE CORONARY ARTERY OF NATIVE HEART WITHOUT ANGINA PECTORIS: Primary | ICD-10-CM

## 2021-12-17 RX ORDER — DILTIAZEM HYDROCHLORIDE 300 MG/1
CAPSULE, COATED, EXTENDED RELEASE ORAL
Qty: 60 CAPSULE | Refills: 5 | Status: SHIPPED | OUTPATIENT
Start: 2021-12-17 | End: 2022-07-15 | Stop reason: SDUPTHER

## 2022-02-21 RX ORDER — NITROGLYCERIN 0.4 MG/1
TABLET SUBLINGUAL
Qty: 75 TABLET | Refills: 0 | Status: SHIPPED | OUTPATIENT
Start: 2022-02-21

## 2022-05-12 ENCOUNTER — OFFICE VISIT (OUTPATIENT)
Dept: CARDIOLOGY | Facility: CLINIC | Age: 87
End: 2022-05-12

## 2022-05-12 VITALS
DIASTOLIC BLOOD PRESSURE: 87 MMHG | HEART RATE: 80 BPM | SYSTOLIC BLOOD PRESSURE: 137 MMHG | BODY MASS INDEX: 29.39 KG/M2 | WEIGHT: 127 LBS | OXYGEN SATURATION: 96 % | HEIGHT: 55 IN

## 2022-05-12 DIAGNOSIS — I25.10 CORONARY ARTERY DISEASE INVOLVING NATIVE CORONARY ARTERY OF NATIVE HEART WITHOUT ANGINA PECTORIS: Primary | ICD-10-CM

## 2022-05-12 DIAGNOSIS — I10 ESSENTIAL HYPERTENSION: ICD-10-CM

## 2022-05-12 DIAGNOSIS — R01.1 HEART MURMUR: ICD-10-CM

## 2022-05-12 DIAGNOSIS — E78.2 MIXED HYPERLIPIDEMIA: ICD-10-CM

## 2022-05-12 PROCEDURE — 99213 OFFICE O/P EST LOW 20 MIN: CPT | Performed by: NURSE PRACTITIONER

## 2022-05-12 PROCEDURE — 93000 ELECTROCARDIOGRAM COMPLETE: CPT | Performed by: NURSE PRACTITIONER

## 2022-05-12 NOTE — PROGRESS NOTES
[facility] CARDIOLOGY      REASON FOR FOLLOW-UP:  Coronary artery disease  Hypertension  Hypertensive cardiovascular disease  Dyslipidemia  Antiplatelet therapy          Chief Complaint   Patient presents with   • Coronary Artery Disease   • Hyperlipidemia         Dear Annamarie Almonte DO        History of Present Illness     I had the pleasure of seeing Renuka Rodrigez today. As you are aware, this is an 87.o. female with an established history of ischemic heart disease.  She underwent PCI of her left anterior descending artery in 2008.  She has additional history that includes hypertension, hypertensive cardiovascular disease, hyperlipidemia, antiplatelet therapy, and venous thrombosis.  She underwent full ischemic work-up with 2D echo 10/8/2020 that showed normal LV systolic function and EF 66-70%, mild MR/TR/AR, grade 1A diastolic dysfunction.  Nuclear stress testing with no evidence of ischemia, normal ECG stress test and low risk study.  She presents today in follow-up for the above-mentioned diagnoses.    Today, Ms. Rodrigez reports that she feels well.  She denies any chest pain, pressure, tightness or palpitations.  She denies any shortness of breath at rest, dyspnea with exertion.  She has some mild edema to her ankles periodically.  She has history of superficial varicosities particularly lower legs and ankles.  She denies dizziness, lightheadedness.  No shortness of breath.    Patient has been caring for handicapped daughter at home for several years and has some associated musculoskeletal complaints.    ASSESSMENT:  Coronary artery disease  History of PCI-LAD  Valvular heart disease  Hypertension with hypertensive cardiovascular disease  Dyslipidemia  History of esophageal stricture  Antiplatelet therapy        PLAN:  I will send order for lipids and BMP to [facility].  I will call patient with results  Continue current CV plan of care  Follow-up in 6 months or sooner if  needed        The following portions of the patient's history were reviewed and updated as appropriate: allergies, current medications, past family history, past medical history, past social history, past surgical history and problem list.    REVIEW OF SYSTEMS:    Review of Systems   Cardiovascular: Positive for leg swelling.   Musculoskeletal: Positive for joint pain.   All other systems reviewed and are negative.      Vitals:    05/12/22 1314   BP: 137/87   Pulse: 80   SpO2: 96%         PHYSICAL EXAM:    General: Alert, cooperative, no distress, appears stated age  Head:  Normocephalic, atraumatic, mucous membranes moist  Eyes:  Conjunctiva/corneas clear, EOM's intact     Neck:  Supple,  no JVD or bruit     Lungs: Clear to auscultation bilaterally, no wheezes rhonchi rales are noted  Chest wall: No tenderness  Musculoskeletal:   Ambulates freely without assistance  Heart::  Regular rate and rhythm, S1 and S2 normal, 2/6 holosystolic murmur  Abdomen: Soft, non-tender, nondistended, bowel sounds active, no abdominal bruit  Extremities: No cyanosis, clubbing, or edema.  Severe superficial varicosities to both lower extremities specifically around ankles.  Pulses: 2+ and symmetric all extremities  Skin:  No rashes or lesions  Neuro/psych: A&O x3. CN II through XII are grossly intact with appropriate affect        Past Medical History:   Diagnosis Date   • Anxiety disorder    • Arthritis    • Coronary artery disease    • Diabetes mellitus (HCC)    • Disc disorder    • Factor 5 Leiden mutation, heterozygous (HCC)    • GERD (gastroesophageal reflux disease)    • Hiatal hernia    • History of blood clots 2020    superficial in right leg    • Hyperlipidemia    • Hypertension    • Neuropathy    • Osteoporosis    • Valvular disease        Past Surgical History:   Procedure Laterality Date   • ABDOMINAL SURGERY     • BLADDER REPAIR     • BREAST BIOPSY Right    • CARDIAC CATHETERIZATION     • CATARACT EXTRACTION     •  "CHOLECYSTECTOMY     • CORONARY ANGIOPLASTY     • HYSTERECTOMY      partial    • TOTAL KNEE ARTHROPLASTY Left          Current Outpatient Medications:   •  aspirin (aspirin) 81 MG EC tablet, ASPIR-LOW 81 MG TBEC, Disp: , Rfl:   •  Calcium Citrate-Vitamin D 1000-400 liquid, CITRACAL/VITAMIN D 250-200 MG-UNIT ORAL TABLET, Disp: , Rfl:   •  Cholecalciferol (VITAMIN D3) 2000 units capsule, VITAMIN D3 2000 UNIT CAPS, Disp: , Rfl:   •  dexlansoprazole (Dexilant) 60 MG capsule, Daily., Disp: , Rfl:   •  dilTIAZem CD (CARDIZEM CD) 300 MG 24 hr capsule, TAKE 1 CAPSULE BY MOUTH EVERY DAY, Disp: 60 capsule, Rfl: 5  •  isosorbide mononitrate (IMDUR) 60 MG 24 hr tablet, TAKE 1 TABLET BY MOUTH EVERY DAY, Disp: 90 tablet, Rfl: 2  •  Multiple Vitamins-Minerals (MULTIVITAMIN ADULTS) tablet, Take  by mouth Daily., Disp: , Rfl:   •  nitroglycerin (NITROSTAT) 0.4 MG SL tablet, TAKE TABLETS BY MOUTH AS DIRECTED, Disp: 75 tablet, Rfl: 0  •  rosuvastatin (CRESTOR) 10 MG tablet, Take 10 mg by mouth Daily., Disp: , Rfl:   •  venlafaxine XR (EFFEXOR-XR) 75 MG 24 hr capsule, Take 75 mg by mouth Daily., Disp: , Rfl: 4  •  zoledronic acid (RECLAST) 5 MG/100ML solution infusion, RECLAST 5 MG/100ML SOLN, Disp: , Rfl:     Allergies   Allergen Reactions   • Erythromycin GI Intolerance       Family History   Problem Relation Age of Onset   • Stroke Mother    • Stroke Sister    • Stroke Sister    • Heart attack Daughter    • Heart disease Daughter        Social History     Tobacco Use   • Smoking status: Never Smoker   • Smokeless tobacco: Never Used   Substance Use Topics   • Alcohol use: Yes     Comment: occasional wine            Current Electrocardiogram:    ECG 12 Lead    Date/Time: 5/12/2022 1:56 PM  Performed by: Maggy Connell APRN  Authorized by: Maggy Connell APRN   Comparison: not compared with previous ECG   Rhythm: sinus rhythm  BPM: 80                  EMR Dragon/Transcription:   \"Dictated utilizing Dragon dictation\". "

## 2022-07-15 DIAGNOSIS — I25.10 CORONARY ARTERY DISEASE INVOLVING NATIVE CORONARY ARTERY OF NATIVE HEART WITHOUT ANGINA PECTORIS: ICD-10-CM

## 2022-07-15 DIAGNOSIS — I10 ESSENTIAL HYPERTENSION: ICD-10-CM

## 2022-07-15 RX ORDER — ISOSORBIDE MONONITRATE 60 MG/1
60 TABLET, EXTENDED RELEASE ORAL DAILY
Qty: 90 TABLET | Refills: 1 | Status: SHIPPED | OUTPATIENT
Start: 2022-07-15 | End: 2023-01-24

## 2022-07-15 RX ORDER — DILTIAZEM HYDROCHLORIDE 300 MG/1
300 CAPSULE, COATED, EXTENDED RELEASE ORAL DAILY
Qty: 90 CAPSULE | Refills: 1 | Status: SHIPPED | OUTPATIENT
Start: 2022-07-15 | End: 2023-01-24

## 2022-09-16 ENCOUNTER — OFFICE (OUTPATIENT)
Dept: URBAN - METROPOLITAN AREA CLINIC 64 | Facility: CLINIC | Age: 87
End: 2022-09-16

## 2022-09-16 VITALS
HEART RATE: 84 BPM | DIASTOLIC BLOOD PRESSURE: 80 MMHG | SYSTOLIC BLOOD PRESSURE: 123 MMHG | HEIGHT: 56 IN | WEIGHT: 127 LBS

## 2022-09-16 DIAGNOSIS — K21.9 GASTRO-ESOPHAGEAL REFLUX DISEASE WITHOUT ESOPHAGITIS: ICD-10-CM

## 2022-09-16 PROCEDURE — 99213 OFFICE O/P EST LOW 20 MIN: CPT | Performed by: INTERNAL MEDICINE

## 2022-09-16 RX ORDER — DEXLANSOPRAZOLE 60 MG/1
60 CAPSULE, DELAYED RELEASE ORAL
Qty: 90 | Refills: 3 | Status: ACTIVE
Start: 2021-09-21

## 2023-01-24 DIAGNOSIS — I25.10 CORONARY ARTERY DISEASE INVOLVING NATIVE CORONARY ARTERY OF NATIVE HEART WITHOUT ANGINA PECTORIS: ICD-10-CM

## 2023-01-24 DIAGNOSIS — I10 ESSENTIAL HYPERTENSION: ICD-10-CM

## 2023-01-24 RX ORDER — DILTIAZEM HYDROCHLORIDE 300 MG/1
300 CAPSULE, COATED, EXTENDED RELEASE ORAL DAILY
Qty: 90 CAPSULE | Refills: 1 | Status: SHIPPED | OUTPATIENT
Start: 2023-01-24

## 2023-01-24 RX ORDER — ISOSORBIDE MONONITRATE 60 MG/1
60 TABLET, EXTENDED RELEASE ORAL DAILY
Qty: 90 TABLET | Refills: 1 | Status: SHIPPED | OUTPATIENT
Start: 2023-01-24